# Patient Record
Sex: FEMALE | Race: WHITE | Employment: FULL TIME | URBAN - METROPOLITAN AREA
[De-identification: names, ages, dates, MRNs, and addresses within clinical notes are randomized per-mention and may not be internally consistent; named-entity substitution may affect disease eponyms.]

---

## 2017-07-23 PROBLEM — I10 ACCELERATED HYPERTENSION: Status: ACTIVE | Noted: 2017-07-23

## 2018-03-05 NOTE — PROGRESS NOTES
I have attempted without success to contact this patient by phone for Preadmission Testing phone assessment. Message left for pt to return call.

## 2018-03-06 RX ORDER — IBUPROFEN 800 MG/1
800 TABLET ORAL EVERY 6 HOURS PRN
COMMUNITY
End: 2019-06-26

## 2018-03-06 RX ORDER — AMLODIPINE BESYLATE 5 MG/1
5 TABLET ORAL DAILY
COMMUNITY
End: 2019-06-26

## 2018-03-17 ENCOUNTER — HOSPITAL ENCOUNTER (OUTPATIENT)
Age: 43
Discharge: HOME | End: 2018-03-17
Payer: COMMERCIAL

## 2018-03-17 LAB
ANION GAP SERPL CALCULATED.3IONS-SCNC: 11 MMOL/L (ref 7–16)
BUN BLDV-MCNC: 15 MG/DL (ref 6–20)
CALCIUM SERPL-MCNC: 9.2 MG/DL (ref 8.6–10.2)
CHLORIDE BLD-SCNC: 101 MMOL/L (ref 98–107)
CO2: 29 MMOL/L (ref 22–29)
CREAT SERPL-MCNC: 0.8 MG/DL (ref 0.5–1)
EKG ATRIAL RATE: 92 BPM
EKG P AXIS: 63 DEGREES
EKG P-R INTERVAL: 124 MS
EKG Q-T INTERVAL: 350 MS
EKG QRS DURATION: 86 MS
EKG QTC CALCULATION (BAZETT): 432 MS
EKG R AXIS: 86 DEGREES
EKG T AXIS: 7 DEGREES
EKG VENTRICULAR RATE: 92 BPM
GFR AFRICAN AMERICAN: >60
GFR NON-AFRICAN AMERICAN: >60 ML/MIN/1.73
GLUCOSE BLD-MCNC: 134 MG/DL (ref 74–109)
HCG(URINE) PREGNANCY TEST: NEGATIVE
POTASSIUM SERPL-SCNC: 3.5 MMOL/L (ref 3.5–5)
SODIUM BLD-SCNC: 141 MMOL/L (ref 132–146)

## 2018-03-17 PROCEDURE — 81025 URINE PREGNANCY TEST: CPT

## 2018-03-17 PROCEDURE — 80048 BASIC METABOLIC PNL TOTAL CA: CPT

## 2018-03-17 PROCEDURE — 36415 COLL VENOUS BLD VENIPUNCTURE: CPT

## 2018-03-23 ENCOUNTER — HOSPITAL ENCOUNTER (OUTPATIENT)
Age: 43
Setting detail: OUTPATIENT SURGERY
Discharge: HOME | End: 2018-03-23
Attending: ORTHOPAEDIC SURGERY | Admitting: ORTHOPAEDIC SURGERY
Payer: COMMERCIAL

## 2018-03-23 ENCOUNTER — ANESTHESIA (OUTPATIENT)
Dept: OPERATING ROOM | Age: 43
End: 2018-03-23
Payer: COMMERCIAL

## 2018-03-23 ENCOUNTER — ANESTHESIA EVENT (OUTPATIENT)
Dept: OPERATING ROOM | Age: 43
End: 2018-03-23
Payer: COMMERCIAL

## 2018-03-23 VITALS
HEIGHT: 60 IN | BODY MASS INDEX: 34.16 KG/M2 | DIASTOLIC BLOOD PRESSURE: 64 MMHG | OXYGEN SATURATION: 99 % | TEMPERATURE: 98.2 F | SYSTOLIC BLOOD PRESSURE: 137 MMHG | WEIGHT: 174 LBS | HEART RATE: 101 BPM | RESPIRATION RATE: 16 BRPM

## 2018-03-23 VITALS — OXYGEN SATURATION: 100 % | DIASTOLIC BLOOD PRESSURE: 73 MMHG | SYSTOLIC BLOOD PRESSURE: 167 MMHG | TEMPERATURE: 96.8 F

## 2018-03-23 DIAGNOSIS — Z01.818 PRE-OP TESTING: Primary | ICD-10-CM

## 2018-03-23 DIAGNOSIS — G89.18 POST-OP PAIN: ICD-10-CM

## 2018-03-23 LAB — HCG(URINE) PREGNANCY TEST: NEGATIVE

## 2018-03-23 PROCEDURE — 7100000000 HC PACU RECOVERY - FIRST 15 MIN: Performed by: ORTHOPAEDIC SURGERY

## 2018-03-23 PROCEDURE — 3700000000 HC ANESTHESIA ATTENDED CARE: Performed by: ORTHOPAEDIC SURGERY

## 2018-03-23 PROCEDURE — 2500000003 HC RX 250 WO HCPCS: Performed by: NURSE ANESTHETIST, CERTIFIED REGISTERED

## 2018-03-23 PROCEDURE — 29848 WRIST ENDOSCOPY/SURGERY: CPT | Performed by: ORTHOPAEDIC SURGERY

## 2018-03-23 PROCEDURE — A4565 SLINGS: HCPCS | Performed by: ORTHOPAEDIC SURGERY

## 2018-03-23 PROCEDURE — 7100000011 HC PHASE II RECOVERY - ADDTL 15 MIN: Performed by: ORTHOPAEDIC SURGERY

## 2018-03-23 PROCEDURE — 3600000012 HC SURGERY LEVEL 2 ADDTL 15MIN: Performed by: ORTHOPAEDIC SURGERY

## 2018-03-23 PROCEDURE — 7100000010 HC PHASE II RECOVERY - FIRST 15 MIN: Performed by: ORTHOPAEDIC SURGERY

## 2018-03-23 PROCEDURE — 81025 URINE PREGNANCY TEST: CPT

## 2018-03-23 PROCEDURE — 3600000002 HC SURGERY LEVEL 2 BASE: Performed by: ORTHOPAEDIC SURGERY

## 2018-03-23 PROCEDURE — 6360000002 HC RX W HCPCS: Performed by: NURSE ANESTHETIST, CERTIFIED REGISTERED

## 2018-03-23 PROCEDURE — 2720000010 HC SURG SUPPLY STERILE: Performed by: ORTHOPAEDIC SURGERY

## 2018-03-23 PROCEDURE — 64718 REVISE ULNAR NERVE AT ELBOW: CPT | Performed by: ORTHOPAEDIC SURGERY

## 2018-03-23 PROCEDURE — 26055 INCISE FINGER TENDON SHEATH: CPT | Performed by: ORTHOPAEDIC SURGERY

## 2018-03-23 PROCEDURE — 3700000001 HC ADD 15 MINUTES (ANESTHESIA): Performed by: ORTHOPAEDIC SURGERY

## 2018-03-23 PROCEDURE — 6360000002 HC RX W HCPCS: Performed by: ANESTHESIOLOGY

## 2018-03-23 PROCEDURE — 7100000001 HC PACU RECOVERY - ADDTL 15 MIN: Performed by: ORTHOPAEDIC SURGERY

## 2018-03-23 PROCEDURE — 2500000003 HC RX 250 WO HCPCS: Performed by: ORTHOPAEDIC SURGERY

## 2018-03-23 PROCEDURE — 6360000002 HC RX W HCPCS: Performed by: PHYSICIAN ASSISTANT

## 2018-03-23 PROCEDURE — 2580000003 HC RX 258: Performed by: PHYSICIAN ASSISTANT

## 2018-03-23 RX ORDER — DIPHENHYDRAMINE HYDROCHLORIDE 50 MG/ML
12.5 INJECTION INTRAMUSCULAR; INTRAVENOUS
Status: DISCONTINUED | OUTPATIENT
Start: 2018-03-23 | End: 2018-03-23 | Stop reason: HOSPADM

## 2018-03-23 RX ORDER — SODIUM CHLORIDE 0.9 % (FLUSH) 0.9 %
10 SYRINGE (ML) INJECTION EVERY 12 HOURS SCHEDULED
Status: DISCONTINUED | OUTPATIENT
Start: 2018-03-23 | End: 2018-03-23 | Stop reason: HOSPADM

## 2018-03-23 RX ORDER — SODIUM CHLORIDE 9 MG/ML
INJECTION, SOLUTION INTRAVENOUS CONTINUOUS
Status: DISCONTINUED | OUTPATIENT
Start: 2018-03-23 | End: 2018-03-23 | Stop reason: HOSPADM

## 2018-03-23 RX ORDER — OXYCODONE HYDROCHLORIDE AND ACETAMINOPHEN 5; 325 MG/1; MG/1
2 TABLET ORAL PRN
Status: DISCONTINUED | OUTPATIENT
Start: 2018-03-23 | End: 2018-03-23 | Stop reason: HOSPADM

## 2018-03-23 RX ORDER — LIDOCAINE HYDROCHLORIDE 20 MG/ML
INJECTION, SOLUTION EPIDURAL; INFILTRATION; INTRACAUDAL; PERINEURAL PRN
Status: DISCONTINUED | OUTPATIENT
Start: 2018-03-23 | End: 2018-03-23 | Stop reason: SDUPTHER

## 2018-03-23 RX ORDER — MIDAZOLAM HYDROCHLORIDE 1 MG/ML
INJECTION INTRAMUSCULAR; INTRAVENOUS PRN
Status: DISCONTINUED | OUTPATIENT
Start: 2018-03-23 | End: 2018-03-23 | Stop reason: SDUPTHER

## 2018-03-23 RX ORDER — MORPHINE SULFATE 2 MG/ML
2 INJECTION, SOLUTION INTRAMUSCULAR; INTRAVENOUS EVERY 5 MIN PRN
Status: DISCONTINUED | OUTPATIENT
Start: 2018-03-23 | End: 2018-03-23 | Stop reason: HOSPADM

## 2018-03-23 RX ORDER — OXYCODONE HYDROCHLORIDE AND ACETAMINOPHEN 5; 325 MG/1; MG/1
1 TABLET ORAL PRN
Status: DISCONTINUED | OUTPATIENT
Start: 2018-03-23 | End: 2018-03-23 | Stop reason: HOSPADM

## 2018-03-23 RX ORDER — MEPERIDINE HYDROCHLORIDE 25 MG/ML
12.5 INJECTION INTRAMUSCULAR; INTRAVENOUS; SUBCUTANEOUS EVERY 5 MIN PRN
Status: DISCONTINUED | OUTPATIENT
Start: 2018-03-23 | End: 2018-03-23 | Stop reason: HOSPADM

## 2018-03-23 RX ORDER — BUPIVACAINE HYDROCHLORIDE AND EPINEPHRINE 5; 5 MG/ML; UG/ML
INJECTION, SOLUTION EPIDURAL; INTRACAUDAL; PERINEURAL PRN
Status: DISCONTINUED | OUTPATIENT
Start: 2018-03-23 | End: 2018-03-23 | Stop reason: HOSPADM

## 2018-03-23 RX ORDER — MORPHINE SULFATE 2 MG/ML
1 INJECTION, SOLUTION INTRAMUSCULAR; INTRAVENOUS EVERY 5 MIN PRN
Status: COMPLETED | OUTPATIENT
Start: 2018-03-23 | End: 2018-03-23

## 2018-03-23 RX ORDER — OXYCODONE HYDROCHLORIDE AND ACETAMINOPHEN 5; 325 MG/1; MG/1
1 TABLET ORAL EVERY 6 HOURS PRN
Qty: 28 TABLET | Refills: 0 | Status: SHIPPED | OUTPATIENT
Start: 2018-03-23 | End: 2018-03-30

## 2018-03-23 RX ORDER — PROPOFOL 10 MG/ML
INJECTION, EMULSION INTRAVENOUS PRN
Status: DISCONTINUED | OUTPATIENT
Start: 2018-03-23 | End: 2018-03-23 | Stop reason: SDUPTHER

## 2018-03-23 RX ORDER — FENTANYL CITRATE 50 UG/ML
INJECTION, SOLUTION INTRAMUSCULAR; INTRAVENOUS PRN
Status: DISCONTINUED | OUTPATIENT
Start: 2018-03-23 | End: 2018-03-23 | Stop reason: SDUPTHER

## 2018-03-23 RX ORDER — SODIUM CHLORIDE 0.9 % (FLUSH) 0.9 %
10 SYRINGE (ML) INJECTION PRN
Status: DISCONTINUED | OUTPATIENT
Start: 2018-03-23 | End: 2018-03-23 | Stop reason: HOSPADM

## 2018-03-23 RX ADMIN — FENTANYL CITRATE 100 MCG: 50 INJECTION, SOLUTION INTRAMUSCULAR; INTRAVENOUS at 09:11

## 2018-03-23 RX ADMIN — MORPHINE SULFATE 1 MG: 2 INJECTION, SOLUTION INTRAMUSCULAR; INTRAVENOUS at 10:05

## 2018-03-23 RX ADMIN — MORPHINE SULFATE 1 MG: 2 INJECTION, SOLUTION INTRAMUSCULAR; INTRAVENOUS at 10:12

## 2018-03-23 RX ADMIN — MORPHINE SULFATE 1 MG: 2 INJECTION, SOLUTION INTRAMUSCULAR; INTRAVENOUS at 10:17

## 2018-03-23 RX ADMIN — LIDOCAINE HYDROCHLORIDE 100 MG: 20 INJECTION, SOLUTION EPIDURAL; INFILTRATION; INTRACAUDAL; PERINEURAL at 09:11

## 2018-03-23 RX ADMIN — MORPHINE SULFATE 1 MG: 2 INJECTION, SOLUTION INTRAMUSCULAR; INTRAVENOUS at 10:22

## 2018-03-23 RX ADMIN — MIDAZOLAM HYDROCHLORIDE 2 MG: 1 INJECTION, SOLUTION INTRAMUSCULAR; INTRAVENOUS at 09:11

## 2018-03-23 RX ADMIN — SODIUM CHLORIDE: 9 INJECTION, SOLUTION INTRAVENOUS at 09:09

## 2018-03-23 RX ADMIN — CEFAZOLIN SODIUM 2 G: 2 SOLUTION INTRAVENOUS at 09:09

## 2018-03-23 RX ADMIN — PROPOFOL 200 MG: 10 INJECTION, EMULSION INTRAVENOUS at 09:11

## 2018-03-23 ASSESSMENT — PULMONARY FUNCTION TESTS
PIF_VALUE: 4
PIF_VALUE: 20
PIF_VALUE: 2
PIF_VALUE: 5
PIF_VALUE: 10
PIF_VALUE: 3
PIF_VALUE: 18
PIF_VALUE: 5
PIF_VALUE: 2
PIF_VALUE: 38
PIF_VALUE: 2
PIF_VALUE: 10
PIF_VALUE: 1
PIF_VALUE: 5
PIF_VALUE: 2
PIF_VALUE: 12
PIF_VALUE: 2
PIF_VALUE: 24
PIF_VALUE: 12
PIF_VALUE: 4
PIF_VALUE: 1
PIF_VALUE: 2
PIF_VALUE: 2
PIF_VALUE: 10
PIF_VALUE: 19
PIF_VALUE: 19
PIF_VALUE: 2
PIF_VALUE: 10
PIF_VALUE: 1
PIF_VALUE: 2
PIF_VALUE: 19
PIF_VALUE: 1
PIF_VALUE: 10
PIF_VALUE: 4
PIF_VALUE: 2
PIF_VALUE: 2
PIF_VALUE: 10
PIF_VALUE: 1
PIF_VALUE: 1
PIF_VALUE: 2

## 2018-03-23 ASSESSMENT — PAIN DESCRIPTION - DESCRIPTORS
DESCRIPTORS: DISCOMFORT;ACHING
DESCRIPTORS: DISCOMFORT
DESCRIPTORS: DULL
DESCRIPTORS: CONSTANT;DISCOMFORT
DESCRIPTORS: DULL

## 2018-03-23 ASSESSMENT — PAIN SCALES - GENERAL
PAINLEVEL_OUTOF10: 6
PAINLEVEL_OUTOF10: 5
PAINLEVEL_OUTOF10: 6
PAINLEVEL_OUTOF10: 2
PAINLEVEL_OUTOF10: 2
PAINLEVEL_OUTOF10: 4
PAINLEVEL_OUTOF10: 2
PAINLEVEL_OUTOF10: 3
PAINLEVEL_OUTOF10: 4

## 2018-03-23 ASSESSMENT — PAIN DESCRIPTION - PAIN TYPE
TYPE: SURGICAL PAIN

## 2018-03-23 ASSESSMENT — PAIN DESCRIPTION - FREQUENCY
FREQUENCY: CONTINUOUS

## 2018-03-23 ASSESSMENT — PAIN DESCRIPTION - ONSET
ONSET: ON-GOING

## 2018-03-23 ASSESSMENT — PAIN DESCRIPTION - PROGRESSION
CLINICAL_PROGRESSION: NOT CHANGED
CLINICAL_PROGRESSION: GRADUALLY WORSENING
CLINICAL_PROGRESSION: GRADUALLY IMPROVING

## 2018-03-23 ASSESSMENT — PAIN DESCRIPTION - ORIENTATION
ORIENTATION: LEFT

## 2018-03-23 ASSESSMENT — PAIN DESCRIPTION - LOCATION
LOCATION: ELBOW

## 2018-03-23 ASSESSMENT — PAIN - FUNCTIONAL ASSESSMENT: PAIN_FUNCTIONAL_ASSESSMENT: 0-10

## 2018-03-23 NOTE — BRIEF OP NOTE
Brief Postoperative Note    Juana Rodriguez  YOB: 1975  [id number]    Pre-operative Diagnosis: left cubital tunnel, left carpal tunnel, left middle finger trigger    Post-operative Diagnosis: Same    Procedure: left endoscopic CTR, left middle finger trigger release, left in situ ulnar n decompression at the elbow    Anesthesia: General and Local    Surgeons/Assistants: Dr. Jorge Luis Crockett, Mary Wagner PA-C    Estimated Blood Loss: less than 50     Complications: None    Specimens: Was Not Obtained    Findings: see op note    Electronically signed by SUMMER Mondragon on 3/23/2018 at 9:56 AM

## 2018-03-23 NOTE — ANESTHESIA PRE PROCEDURE
Department of Anesthesiology  Preprocedure Note       Name:  Katrina Rosario   Age:  43 y.o.  :  1975                                          MRN:  [id number]         Date:  3/23/2018      Surgeon: Nguyễn Godinez):  Remy Bright MD    Procedure: Procedure(s):  LEFT ULNAR NERVER IN SITES DECOMPRESSION WITH POSSIBLE TRANSPOSITION     LEFT ENDOSCOPIC CARPAL TUNNEL RELEASE    LEFT MIDDLE TRIGGER FINGER RELEASE    Medications prior to admission:   Prior to Admission medications    Medication Sig Start Date End Date Taking? Authorizing Provider   oxyCODONE-acetaminophen (PERCOCET) 5-325 MG per tablet Take 1 tablet by mouth every 6 hours as needed for Pain for up to 7 days . Take lowest dose possible to manage pain. 3/23/18 3/30/18 Yes Javier Sevilla DO   amLODIPine (NORVASC) 5 MG tablet Take 5 mg by mouth daily   Yes Historical Provider, MD   ibuprofen (ADVIL;MOTRIN) 800 MG tablet Take 800 mg by mouth every 6 hours as needed for Pain   Yes Historical Provider, MD   valsartan (DIOVAN) 160 MG tablet Take 1 tablet by mouth daily 17  Yes Veronique Garcia MD   hydrochlorothiazide (HYDRODIURIL) 25 MG tablet Take 1 tablet by mouth daily 17  Yes Veronique Garcia MD   venlafaxine (EFFEXOR XR) 75 MG extended release capsule Take 75 mg by mouth every evening  17  Yes Historical Provider, MD   phentermine (ADIPEX-P) 37.5 MG capsule Take 37.5 mg by mouth every morning .     Historical Provider, MD       Current medications:    Current Facility-Administered Medications   Medication Dose Route Frequency Provider Last Rate Last Dose    0.9 % sodium chloride infusion   Intravenous Continuous SUMMER Gorman        ceFAZolin (ANCEF) 2 g in sterile water 20 mL IV syringe  2 g Intravenous On Call to [address] [place] [address]        sodium chloride flush 0.9 % injection 10 mL  10 mL Intravenous 2 times per day SUMMER Gorman        sodium chloride flush 0.9 % injection 10 mL  10 mL Intravenous PRN SUMMER Gorman       [facility] FEBRUARY 2012    DIAGNOSTIC LAPAROSCOPY       Social History:    Social History   Substance Use Topics    Smoking status: Never Smoker    Smokeless tobacco: Never Used    Alcohol use Yes      Comment: occassional                                Counseling given: Not Answered      Vital Signs (Current):   Vitals:    03/23/18 0813 03/23/18 0815   BP: 124/69    Pulse: 92    Resp: 20    Temp: 97.9 °F (36.6 °C)    TempSrc: Temporal    SpO2: 99%    Weight: 171 lb (77.6 kg) 174 lb (78.9 kg)   Height: 5' (1.524 m)                                               BP Readings from Last 3 Encounters:   03/23/18 124/69   02/08/18 (!) 138/94   12/17/17 133/82       NPO Status: Time of last liquid consumption: 2300                        Time of last solid consumption: 2300                        Date of last liquid consumption: 03/22/18                        Date of last solid food consumption: 03/22/18    BMI:   Wt Readings from Last 3 Encounters:   03/23/18 174 lb (78.9 kg)   02/08/18 174 lb (78.9 kg)   12/17/17 176 lb (79.8 kg)     Body mass index is 33.98 kg/m². CBC:   Lab Results   Component Value Date    WBC 15.7 07/20/2017    RBC 4.81 07/20/2017    HGB 12.6 07/20/2017    HCT 39.9 07/20/2017    MCV 83.0 07/20/2017    RDW 13.9 07/20/2017     07/20/2017       CMP:   Lab Results   Component Value Date     03/17/2018    K 3.5 03/17/2018     03/17/2018    CO2 29 03/17/2018    BUN 15 03/17/2018    CREATININE 0.8 03/17/2018    GFRAA >60 03/17/2018    LABGLOM >60 03/17/2018    GLUCOSE 134 03/17/2018    GLUCOSE 84 12/27/2011    PROT 7.1 11/21/2014    CALCIUM 9.2 03/17/2018    BILITOT 0.2 11/21/2014    ALKPHOS 49 11/21/2014    AST 22 11/21/2014    ALT 34 11/21/2014       POC Tests: No results for input(s): POCGLU, POCNA, POCK, POCCL, POCBUN, POCHEMO, POCHCT in the last 72 hours.     Coags:   Lab Results   Component Value Date    PROTIME 13.7 10/02/2012    INR 1.3 10/02/2012    APTT 30.2 10/02/2012       HCG (If

## 2018-04-02 ENCOUNTER — OFFICE VISIT (OUTPATIENT)
Dept: ORTHOPEDIC SURGERY | Age: 43
End: 2018-04-02

## 2018-04-02 VITALS
BODY MASS INDEX: 32.85 KG/M2 | SYSTOLIC BLOOD PRESSURE: 122 MMHG | TEMPERATURE: 98.2 F | HEIGHT: 61 IN | HEART RATE: 95 BPM | WEIGHT: 174 LBS | DIASTOLIC BLOOD PRESSURE: 68 MMHG

## 2018-04-02 DIAGNOSIS — G56.03 BILATERAL CARPAL TUNNEL SYNDROME: Primary | ICD-10-CM

## 2018-04-02 PROCEDURE — 99024 POSTOP FOLLOW-UP VISIT: CPT | Performed by: ORTHOPAEDIC SURGERY

## 2018-12-24 ENCOUNTER — HOSPITAL ENCOUNTER (OUTPATIENT)
Dept: ULTRASOUND IMAGING | Age: 43
Discharge: HOME | End: 2018-12-26
Payer: MEDICAID

## 2018-12-24 DIAGNOSIS — E01.0 BIG THYROID: ICD-10-CM

## 2018-12-24 PROCEDURE — 76536 US EXAM OF HEAD AND NECK: CPT

## 2019-06-26 ENCOUNTER — HOSPITAL ENCOUNTER (EMERGENCY)
Age: 44
Discharge: HOME | End: 2019-06-26
Attending: EMERGENCY MEDICINE
Payer: COMMERCIAL

## 2019-06-26 ENCOUNTER — APPOINTMENT (OUTPATIENT)
Dept: CT IMAGING | Age: 44
End: 2019-06-26
Payer: COMMERCIAL

## 2019-06-26 VITALS
HEART RATE: 92 BPM | SYSTOLIC BLOOD PRESSURE: 132 MMHG | HEIGHT: 61 IN | RESPIRATION RATE: 16 BRPM | TEMPERATURE: 98.6 F | BODY MASS INDEX: 31.15 KG/M2 | DIASTOLIC BLOOD PRESSURE: 86 MMHG | WEIGHT: 165 LBS | OXYGEN SATURATION: 98 %

## 2019-06-26 DIAGNOSIS — R10.9 FLANK PAIN: Primary | ICD-10-CM

## 2019-06-26 DIAGNOSIS — M54.50 LOW BACK PAIN WITHOUT SCIATICA, UNSPECIFIED BACK PAIN LATERALITY, UNSPECIFIED CHRONICITY: ICD-10-CM

## 2019-06-26 LAB
BACTERIA: ABNORMAL /HPF
BILIRUBIN URINE: NEGATIVE
BLOOD, URINE: ABNORMAL
CLARITY: CLEAR
COLOR: YELLOW
GLUCOSE URINE: NEGATIVE MG/DL
HCG(URINE) PREGNANCY TEST: NEGATIVE
KETONES, URINE: NEGATIVE MG/DL
LEUKOCYTE ESTERASE, URINE: NEGATIVE
NITRITE, URINE: NEGATIVE
PH UA: 5.5 (ref 5–9)
PROTEIN UA: NEGATIVE MG/DL
RBC UA: ABNORMAL /HPF (ref 0–2)
SPECIFIC GRAVITY UA: 1.02 (ref 1–1.03)
UROBILINOGEN, URINE: 0.2 E.U./DL
WBC UA: ABNORMAL /HPF (ref 0–5)

## 2019-06-26 PROCEDURE — 74176 CT ABD & PELVIS W/O CONTRAST: CPT

## 2019-06-26 PROCEDURE — 6360000002 HC RX W HCPCS: Performed by: EMERGENCY MEDICINE

## 2019-06-26 PROCEDURE — 81025 URINE PREGNANCY TEST: CPT

## 2019-06-26 PROCEDURE — 99284 EMERGENCY DEPT VISIT MOD MDM: CPT

## 2019-06-26 PROCEDURE — 81001 URINALYSIS AUTO W/SCOPE: CPT

## 2019-06-26 RX ORDER — ONDANSETRON 2 MG/ML
4 INJECTION INTRAMUSCULAR; INTRAVENOUS EVERY 6 HOURS PRN
Status: DISCONTINUED | OUTPATIENT
Start: 2019-06-26 | End: 2019-06-26 | Stop reason: HOSPADM

## 2019-06-26 RX ORDER — KETOROLAC TROMETHAMINE 30 MG/ML
30 INJECTION, SOLUTION INTRAMUSCULAR; INTRAVENOUS EVERY 6 HOURS PRN
Status: DISCONTINUED | OUTPATIENT
Start: 2019-06-26 | End: 2019-06-26 | Stop reason: HOSPADM

## 2019-06-26 RX ADMIN — ONDANSETRON HYDROCHLORIDE 4 MG: 2 SOLUTION INTRAMUSCULAR; INTRAVENOUS at 15:43

## 2019-06-26 RX ADMIN — KETOROLAC TROMETHAMINE 30 MG: 30 INJECTION, SOLUTION INTRAMUSCULAR at 15:45

## 2019-06-26 ASSESSMENT — PAIN DESCRIPTION - DESCRIPTORS: DESCRIPTORS: ACHING

## 2019-06-26 ASSESSMENT — PAIN SCALES - GENERAL
PAINLEVEL_OUTOF10: 7
PAINLEVEL_OUTOF10: 7

## 2019-06-26 ASSESSMENT — PAIN DESCRIPTION - FREQUENCY: FREQUENCY: INTERMITTENT

## 2019-06-26 ASSESSMENT — PAIN DESCRIPTION - ORIENTATION: ORIENTATION: RIGHT

## 2019-06-26 ASSESSMENT — PAIN DESCRIPTION - LOCATION: LOCATION: FLANK

## 2019-08-18 ENCOUNTER — HOSPITAL ENCOUNTER (EMERGENCY)
Age: 44
Discharge: HOME | End: 2019-08-18
Attending: EMERGENCY MEDICINE
Payer: COMMERCIAL

## 2019-08-18 VITALS
SYSTOLIC BLOOD PRESSURE: 132 MMHG | HEART RATE: 92 BPM | WEIGHT: 168 LBS | HEIGHT: 61 IN | TEMPERATURE: 98.5 F | OXYGEN SATURATION: 98 % | RESPIRATION RATE: 16 BRPM | BODY MASS INDEX: 31.72 KG/M2 | DIASTOLIC BLOOD PRESSURE: 78 MMHG

## 2019-08-18 DIAGNOSIS — T14.8XXA BRUISE: Primary | ICD-10-CM

## 2019-08-18 DIAGNOSIS — T14.8XXA HEMATOMA: ICD-10-CM

## 2019-08-18 PROCEDURE — 99283 EMERGENCY DEPT VISIT LOW MDM: CPT

## 2019-08-18 ASSESSMENT — PAIN DESCRIPTION - PROGRESSION: CLINICAL_PROGRESSION: GRADUALLY WORSENING

## 2019-08-18 ASSESSMENT — PAIN DESCRIPTION - ONSET: ONSET: GRADUAL

## 2019-08-18 ASSESSMENT — PAIN DESCRIPTION - LOCATION: LOCATION: ARM

## 2019-08-18 ASSESSMENT — PAIN DESCRIPTION - PAIN TYPE: TYPE: ACUTE PAIN

## 2019-08-18 ASSESSMENT — PAIN DESCRIPTION - DESCRIPTORS: DESCRIPTORS: ACHING;ITCHING

## 2019-08-18 ASSESSMENT — PAIN DESCRIPTION - ORIENTATION: ORIENTATION: RIGHT

## 2019-08-18 ASSESSMENT — PAIN DESCRIPTION - FREQUENCY: FREQUENCY: CONTINUOUS

## 2019-08-18 ASSESSMENT — PAIN SCALES - GENERAL: PAINLEVEL_OUTOF10: 4

## 2019-08-19 ASSESSMENT — ENCOUNTER SYMPTOMS
BLOOD IN STOOL: 0
ABDOMINAL PAIN: 0
COUGH: 0
NAUSEA: 0
BACK PAIN: 0
VOMITING: 0
SHORTNESS OF BREATH: 0

## 2019-08-19 NOTE — ED PROVIDER NOTES
detail and they are aware of the specific conditions for emergent return, as well as the importance of follow-up. There are no discharge medications for this patient. Diagnosis:  1. Bruise    2. Hematoma        Disposition:  Patient's disposition: Discharge to home  Patient's condition is stable. [facility]    ED Course as of Aug 18 2159   Brennne Light Aug 18, 2019   2157 Labs drawn on Thursday and has been having some dependent edema and hematoma spreading distally but not proximally. Pulses are 2+ in the antecubital fossa as well as radial and ulnar. Distal cap refill less than 2 seconds. Hands warm and well-perfused. Sensation intact. Strength equal and within normal limits. See her posted lab work done a couple of days ago. Deferred lab work today. The patient is nontoxic appearing and stable for outpatient treatment and management. They were instructed to follow-up with their primary care physician and were given warning signs to return to the ED. All of their questions were answered at this time and are agreeable with discharge and early follow up.     [BM]      ED Course User Index  [BM] DO Leo Conroy DO  Resident  08/19/19 [id number]

## 2020-02-14 ENCOUNTER — TELEPHONE (OUTPATIENT)
Dept: ORTHOPEDIC SURGERY | Age: 45
End: 2020-02-14

## 2020-02-18 ENCOUNTER — OFFICE VISIT (OUTPATIENT)
Dept: ORTHOPEDIC SURGERY | Age: 45
End: 2020-02-18
Payer: COMMERCIAL

## 2020-02-18 VITALS — HEART RATE: 80 BPM | SYSTOLIC BLOOD PRESSURE: 130 MMHG | DIASTOLIC BLOOD PRESSURE: 80 MMHG | RESPIRATION RATE: 18 BRPM

## 2020-02-18 PROCEDURE — 20551 NJX 1 TENDON ORIGIN/INSJ: CPT | Performed by: ORTHOPAEDIC SURGERY

## 2020-02-18 PROCEDURE — 1036F TOBACCO NON-USER: CPT | Performed by: ORTHOPAEDIC SURGERY

## 2020-02-18 PROCEDURE — G8427 DOCREV CUR MEDS BY ELIG CLIN: HCPCS | Performed by: ORTHOPAEDIC SURGERY

## 2020-02-18 PROCEDURE — G8417 CALC BMI ABV UP PARAM F/U: HCPCS | Performed by: ORTHOPAEDIC SURGERY

## 2020-02-18 PROCEDURE — G8484 FLU IMMUNIZE NO ADMIN: HCPCS | Performed by: ORTHOPAEDIC SURGERY

## 2020-02-18 PROCEDURE — 99213 OFFICE O/P EST LOW 20 MIN: CPT | Performed by: ORTHOPAEDIC SURGERY

## 2020-02-18 RX ORDER — BETAMETHASONE SODIUM PHOSPHATE AND BETAMETHASONE ACETATE 3; 3 MG/ML; MG/ML
6 INJECTION, SUSPENSION INTRA-ARTICULAR; INTRALESIONAL; INTRAMUSCULAR; SOFT TISSUE ONCE
Status: COMPLETED | OUTPATIENT
Start: 2020-02-18 | End: 2020-02-18

## 2020-02-18 RX ORDER — AMLODIPINE BESYLATE 5 MG/1
5 TABLET ORAL DAILY
COMMUNITY
End: 2021-10-12

## 2020-02-18 RX ADMIN — BETAMETHASONE SODIUM PHOSPHATE AND BETAMETHASONE ACETATE 6 MG: 3; 3 INJECTION, SUSPENSION INTRA-ARTICULAR; INTRALESIONAL; INTRAMUSCULAR; SOFT TISSUE at 15:44

## 2020-02-18 NOTE — PATIENT INSTRUCTIONS
Patient Education        Tennis Elbow: Exercises  Introduction  Here are some examples of exercises for you to try. The exercises may be suggested for a condition or for rehabilitation. Start each exercise slowly. Ease off the exercises if you start to have pain. You will be told when to start these exercises and which ones will work best for you. How to do the exercises  Wrist flexor stretch   1. Extend your arm in front of you with your palm up. 2. Bend your wrist, pointing your hand toward the floor. 3. With your other hand, gently bend your wrist farther until you feel a mild to moderate stretch in your forearm. 4. Hold for at least 15 to 30 seconds. Repeat 2 to 4 times. Wrist extensor stretch   1. Repeat steps 1 to 4 of the stretch above but begin with your extended hand palm down. Ball or sock squeeze   1. Hold a tennis ball (or a rolled-up sock) in your hand. 2. Make a fist around the ball (or sock) and squeeze. 3. Hold for about 6 seconds, and then relax for up to 10 seconds. 4. Repeat 8 to 12 times. 5. Switch the ball (or sock) to your other hand and do 8 to 12 times. Wrist deviation   1. Sit so that your arm is supported but your hand hangs off the edge of a flat surface, such as a table. 2. Hold your hand out like you are shaking hands with someone. 3. Move your hand up and down. 4. Repeat this motion 8 to 12 times. 5. Switch arms. 6. Try to do this exercise twice with each hand. Wrist curls   1. Place your forearm on a table with your hand hanging over the edge of the table, palm up. 2. Place a 1- to 2-pound weight in your hand. This may be a dumbbell, a can of food, or a filled water bottle. 3. Slowly raise and lower the weight while keeping your forearm on the table and your palm facing up. 4. Repeat this motion 8 to 12 times. 5. Switch arms, and do steps 1 through 4.  6. Repeat with your hand facing down toward the floor. Switch arms. Biceps curls   1.  Sit leaning forward with your legs slightly spread and your left hand on your left thigh. 2. Place your right elbow on your right thigh, and hold the weight with your forearm horizontal.  3. Slowly curl the weight up and toward your chest.  4. Repeat this motion 8 to 12 times. 5. Switch arms, and do steps 1 through 4. Follow-up care is a key part of your treatment and safety. Be sure to make and go to all appointments, and call your doctor if you are having problems. It's also a good idea to know your test results and keep a list of the medicines you take. Where can you learn more? Go to https://[employer] RochesterpeCARD.comeweb.Geekangels. org and sign in to your Infinite Z account. Enter I928 in the Product Hunt box to learn more about \"Tennis Elbow: Exercises. \"     If you do not have an account, please click on the \"Sign Up Now\" link. Current as of: June 26, 2019  Content Version: 12.3  © 5397-6744 Healthwise, Incorporated. Care instructions adapted under license by [facility] ([facility]). If you have questions about a medical condition or this instruction, always ask your healthcare professional. [place] [address] any warranty or liability for your use of this information.

## 2020-02-18 NOTE — PROGRESS NOTES
Department of Orthopedic Surgery  [place] Andrez Vega MD  History and Physical      CHIEF COMPLAINT:  Right elbow pain     HISTORY OF PRESENT ILLNESS:                The patient is a 40 y.o. female who presents with right elbow pain that has been going on since 2019 and is getting worse. 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 She states she had similar pain on the other elbow that has resolved as well as cubital tunnel and carpal tunnel releases in 2018. She says that she does have some numbness into her right small finger that feels similar to her symptoms on the other elbow. She has tried a counter force elbow brace without any relief in her symptoms.      Past Medical History:        Diagnosis Date    Accelerated hypertension 2017    Asthma     Carpal tunnel syndrome of left wrist     Fibromuscular dysplasia (HCC)     affects arteries, blockages occur    History of seasonal allergies     HX OTHER MEDICAL 2017    renal arteries bilaterally blocked; awaiting [facility] visit for angioplasties    Migraines     Pulmonary emboli ([facility] [address]      Past Surgical History:        Procedure Laterality Date    APPENDECTOMY      CARPAL TUNNEL RELEASE Right 2017     SECTION      x 3    DILATION AND CURETTAGE OF UTERUS  2014    Laparoscopy    DILATION AND CURETTAGE OF UTERUS  2015    KNEE SURGERY Left     reconstruction    KNEE SURGERY Left     acl    LAPAROSCOPY  2012    DIAGNOSTIC LAPAROSCOPY    OTHER SURGICAL HISTORY Left 2018    carpal tunnel release, middle finger release, ulnar nerve decompression    [place] INCISE FINGER TENDON SHEATH Left 3/23/2018    LEFT MIDDLE TRIGGER FINGER RELEASE performed by [place] Lowe MD at [place] [address] Left 3/23/2018    LEFT ULNAR NERVER IN SITES DECOMPRESSION   performed by [facility] Lowe MD at [facility] GONZAL[facility] Left 3/23/2018    LEFT ENDOSCOPIC CARPAL TUNNEL RELEASE   performed by Ansley Figueroa MD at [facility] OR     Current Medications:   No current facility-administered medications for this visit. Allergies:  Dye [iodides] and Vicodin [hydrocodone-acetaminophen]    Social History:   TOBACCO:   reports that she has never smoked. She has never used smokeless tobacco.  ETOH:   reports current alcohol use. DRUGS:   reports no history of drug use. ACTIVITIES OF DAILY LIVING:    OCCUPATION:    Family History:   History reviewed. No pertinent family history. REVIEW OF SYSTEMS:  CONSTITUTIONAL:  negative  EYES:  negative  HEENT:  negative  RESPIRATORY:  negative  CARDIOVASCULAR:  negative  MUSCULOSKELETAL:  positive for  Pain right elbow  NEUROLOGICAL:  positive for numbness and tingling right hand    PHYSICAL EXAM:    VITALS:  /80 (Site: Right Upper Arm, Position: Sitting, Cuff Size: Medium Adult)   Pulse 80   Resp 18   CONSTITUTIONAL:  awake, alert, cooperative, no apparent distress, and appears stated age  EYES:  Lids and lashes normal, pupils equal, round and reactive to light, extra ocular muscles intact, sclera clear, conjunctiva normal  ENT:  normocepalic, without obvious abnormality  NECK:  supple, symmetrical, trachea midline  LUNGS:  no increased work of breathing  CARDIOVASCULAR:  no edema  NEUROLOGIC:  Awake, alert, oriented to name, place and time. Cranial nerves II-XII are grossly intact. Motor is 5 out of 5 bilaterally. MUSCULOSKELETAL:  Right upper extremity: Full ROM to the shoulder and elbow. + TTP over the lateral epicondyle, no pain over the medial epicondyle. + tinel over the cubital tunnel. + Pain with resisted wrist extension. - tinel at the wrist, - conchita. SILT median, ulnar, radial nerve distributions. +2/4 radial pulse.      DATA:    CBC:   Lab Results   Component Value Date    WBC 15.7 07/20/2017    RBC 4.81 07/20/2017    HGB 12.6 07/20/2017    HCT 39.9 07/20/2017    MCV 83.0 07/20/2017    MCH 26.2 07/20/2017    MCHC 31.6 07/20/2017    RDW 13.9 07/20/2017     07/20/2017    MPV 8.5 07/20/2017     PT/INR:    Lab Results   Component Value Date    PROTIME 13.7 10/02/2012    INR 1.3 10/02/2012       Radiology Review:  Xray right elbow obtained and reviewed in office today. AP and lateral demonstrating no fracture or dislocation. Impression no fracture or dislocation. IMPRESSION:  · Right elbow lateral epicondylitis   · Right ulnar neuritis     PLAN:  Patient wanting and injection today for her elbow. Given wrist splint for when it flares up. Will start patient in a physical therapy program as well. Follow up in 3 months. Procedure Note Cortisone Injection for Elbow Epicondylitis    The right lateral epicondyle was identified as the injection site. The risk and benefits of a cortisone injection were explained and the patient consented to the injection. Under sterile conditions, the epicondyle was injected with a mixture of 1 mL of 1% Lidocaine and 1 mL of 6 mg/mL Betamethasone without complication. A sterile bandage was applied. I have seen and evaluated the patient and agree with the above assessment and plan on today's visit. I have performed the key components of the history and physical examination with significant findings of persistent right lateral epicondylitis refractory to home exercise program, oral anti-inflammatories, and counterforce bracing. Patient was provided a cortisone injection over the lateral condyle. Cock-up wrist brace was provided. Patient is referred to formal therapy. May follow-up every 3 months as needed. I concur with the findings and plan as documented.     Nicole Green MD  2/18/2020

## 2020-08-06 ENCOUNTER — OFFICE VISIT (OUTPATIENT)
Dept: ORTHOPEDIC SURGERY | Age: 45
End: 2020-08-06
Payer: COMMERCIAL

## 2020-08-06 VITALS — HEIGHT: 61 IN | BODY MASS INDEX: 31.72 KG/M2 | TEMPERATURE: 98.8 F | RESPIRATION RATE: 14 BRPM | WEIGHT: 168 LBS

## 2020-08-06 PROCEDURE — G8427 DOCREV CUR MEDS BY ELIG CLIN: HCPCS | Performed by: ORTHOPAEDIC SURGERY

## 2020-08-06 PROCEDURE — 1036F TOBACCO NON-USER: CPT | Performed by: ORTHOPAEDIC SURGERY

## 2020-08-06 PROCEDURE — 20551 NJX 1 TENDON ORIGIN/INSJ: CPT | Performed by: ORTHOPAEDIC SURGERY

## 2020-08-06 PROCEDURE — G8417 CALC BMI ABV UP PARAM F/U: HCPCS | Performed by: ORTHOPAEDIC SURGERY

## 2020-08-06 PROCEDURE — 99212 OFFICE O/P EST SF 10 MIN: CPT | Performed by: ORTHOPAEDIC SURGERY

## 2020-08-06 RX ORDER — BETAMETHASONE SODIUM PHOSPHATE AND BETAMETHASONE ACETATE 3; 3 MG/ML; MG/ML
6 INJECTION, SUSPENSION INTRA-ARTICULAR; INTRALESIONAL; INTRAMUSCULAR; SOFT TISSUE ONCE
Status: COMPLETED | OUTPATIENT
Start: 2020-08-06 | End: 2020-08-06

## 2020-08-06 RX ADMIN — BETAMETHASONE SODIUM PHOSPHATE AND BETAMETHASONE ACETATE 6 MG: 3; 3 INJECTION, SUSPENSION INTRA-ARTICULAR; INTRALESIONAL; INTRAMUSCULAR; SOFT TISSUE at 12:16

## 2020-08-06 NOTE — PATIENT INSTRUCTIONS
swing.  · Think about asking your employer about new ways of doing your job if your elbow pain is caused by something you do at work. Medicines  · Be safe with medicines. Read and follow all instructions on the label. ? If the doctor gave you a prescription medicine for pain, take it as prescribed. ? If you are not taking a prescription pain medicine, ask your doctor if you can take an over-the-counter medicine. When should you call for help? Call your doctor now or seek immediate medical care if:  · Your pain is worse. · You cannot bend your elbow normally. · Your arm or hand is cool or pale or changes color. · You have tingling, weakness, or numbness in your hand and fingers. Watch closely for changes in your health, and be sure to contact your doctor if:  · You have work problems caused by your elbow pain. · Your pain is not better after 2 weeks. Where can you learn more? Go to https://[place].Farecast. org and sign in to your AYLIEN account. Enter 0699 [id number] [phone] in the Wealthfront box to learn more about \"Tennis Elbow: Care Instructions. \"     If you do not have an account, please click on the \"Sign Up Now\" link. Current as of: March 2, 2020               Content Version: 12.5  © 2006-2020 Healthwise, Incorporated. Care instructions adapted under license by [facility] ([facility]). If you have questions about a medical condition or this instruction, always ask your healthcare professional. [place] [address] any warranty or liability for your use of this information. Patient Education        Tennis Elbow: Exercises  Introduction  Here are some examples of exercises for you to try. The exercises may be suggested for a condition or for rehabilitation. Start each exercise slowly. Ease off the exercises if you start to have pain. You will be told when to start these exercises and which ones will work best for you. How to do the exercises  Wrist flexor stretch   1.  Extend your arm in front of you with your palm up. 2. Bend your wrist, pointing your hand toward the floor. 3. With your other hand, gently bend your wrist farther until you feel a mild to moderate stretch in your forearm. 4. Hold for at least 15 to 30 seconds. Repeat 2 to 4 times. Wrist extensor stretch   1. Repeat steps 1 to 4 of the stretch above but begin with your extended hand palm down. Ball or sock squeeze   1. Hold a tennis ball (or a rolled-up sock) in your hand. 2. Make a fist around the ball (or sock) and squeeze. 3. Hold for about 6 seconds, and then relax for up to 10 seconds. 4. Repeat 8 to 12 times. 5. Switch the ball (or sock) to your other hand and do 8 to 12 times. Wrist deviation   1. Sit so that your arm is supported but your hand hangs off the edge of a flat surface, such as a table. 2. Hold your hand out like you are shaking hands with someone. 3. Move your hand up and down. 4. Repeat this motion 8 to 12 times. 5. Switch arms. 6. Try to do this exercise twice with each hand. Wrist curls   1. Place your forearm on a table with your hand hanging over the edge of the table, palm up. 2. Place a 1- to 2-pound weight in your hand. This may be a dumbbell, a can of food, or a filled water bottle. 3. Slowly raise and lower the weight while keeping your forearm on the table and your palm facing up. 4. Repeat this motion 8 to 12 times. 5. Switch arms, and do steps 1 through 4.  6. Repeat with your hand facing down toward the floor. Switch arms. Biceps curls   1. Sit leaning forward with your legs slightly spread and your left hand on your left thigh. 2. Place your right elbow on your right thigh, and hold the weight with your forearm horizontal.  3. Slowly curl the weight up and toward your chest.  4. Repeat this motion 8 to 12 times. 5. Switch arms, and do steps 1 through 4. Follow-up care is a key part of your treatment and safety.  Be sure to make and go to all appointments, and call your doctor if you are having problems. It's also a good idea to know your test results and keep a list of the medicines you take. Where can you learn more? Go to https://ARYx TherapeuticspedannyDel Sol Espana.Questetra. org and sign in to your Peaberry Software account. Enter N974 in the iXpert box to learn more about \"Tennis Elbow: Exercises. \"     If you do not have an account, please click on the \"Sign Up Now\" link. Current as of: March 2, 2020               Content Version: 12.5  © 8494-2756 Healthwise, Incorporated. Care instructions adapted under license by [facility] ([facility]). If you have questions about a medical condition or this instruction, always ask your healthcare professional. [address] any warranty or liability for your use of this information.

## 2020-08-06 NOTE — PROGRESS NOTES
Department of Orthopedic Surgery  Progress Note      CHIEF COMPLAINT:  Right elbow pain     HISTORY OF PRESENT ILLNESS:                The patient is a 40 y.o. female who presents with right elbow pain that has been going on since 2019. 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 She states she had similar pain on the other elbow that has resolved as well as cubital tunnel and carpal tunnel releases in 2018. She says that she does have some numbness into her right small finger that feels similar to her symptoms on the other elbow. She has tried a counter force elbow brace without any relief in her symptoms. Patient was seen 2020 where she received a right lateral epicondylitis injection. Patient states she had 90% relief for 3 months. Patient was scheduled for physical therapy however due to COVID-19 she was unable to attend physical therapy. Patient states recently she has been able to attend physical therapy. Patient is requesting right elbow injection today.     Past Medical History:        Diagnosis Date    Accelerated hypertension 2017    Asthma     Carpal tunnel syndrome of left wrist     Fibromuscular dysplasia (HCC)     affects arteries, blockages occur    History of seasonal allergies     HX OTHER MEDICAL 2017    renal arteries bilaterally blocked; awaiting [facility] visit for angioplasties    Migraines 's    Pulmonary emboli ([facility] [address]      Past Surgical History:        Procedure Laterality Date    APPENDECTOMY      CARPAL TUNNEL RELEASE Right 2017     SECTION      x 3    DILATION AND CURETTAGE OF UTERUS  2014    Laparoscopy    DILATION AND CURETTAGE OF UTERUS  2015    KNEE SURGERY Left     reconstruction    KNEE SURGERY Left     acl    LAPAROSCOPY  2012    DIAGNOSTIC LAPAROSCOPY    OTHER SURGICAL HISTORY Left 2018    carpal tunnel release, middle finger release, ulnar nerve decompression    MD INCISE FINGER TENDON SHEATH Left 3/23/2018    LEFT MIDDLE TRIGGER FINGER RELEASE performed by Nikos Salgado MD at [facility] Left 3/23/2018    LEFT ULNAR NERVER IN SITES DECOMPRESSION   performed by Nikos Salgado MD at [address] [facility] Client LIG Left 3/23/2018    LEFT ENDOSCOPIC CARPAL TUNNEL RELEASE   performed by Nikos Salgado MD at [address]     Current Medications:   No current facility-administered medications for this visit. Allergies:  Dye [iodides] and Vicodin [hydrocodone-acetaminophen]    Social History:   TOBACCO:   reports that she has never smoked. She has never used smokeless tobacco.  ETOH:   reports current alcohol use. DRUGS:   reports no history of drug use. ACTIVITIES OF DAILY LIVING:    OCCUPATION:    Family History:   No family history on file. REVIEW OF SYSTEMS:  CONSTITUTIONAL:  negative  EYES:  negative  HEENT:  negative  RESPIRATORY:  negative  CARDIOVASCULAR:  negative  MUSCULOSKELETAL:  positive for  Pain right elbow  NEUROLOGICAL:  positive for numbness and tingling right hand    PHYSICAL EXAM:    VITALS:  Temp 98.8 °F (37.1 °C) (Infrared)   Resp 14   Ht 5' 1\" (1.549 m)   Wt 168 lb (76.2 kg)   BMI 31.74 kg/m²   CONSTITUTIONAL:  awake, alert, cooperative, no apparent distress, and appears stated age  EYES:  Lids and lashes normal, pupils equal, round and reactive to light, extra ocular muscles intact, sclera clear, conjunctiva normal  ENT:  normocepalic, without obvious abnormality  NECK:  supple, symmetrical, trachea midline  LUNGS:  no increased work of breathing  CARDIOVASCULAR:  no edema  NEUROLOGIC:  Awake, alert, oriented to name, place and time. Cranial nerves II-XII are grossly intact. Motor is 5 out of 5 bilaterally. MUSCULOSKELETAL:  Right upper extremity: Full ROM to the shoulder and elbow.  + TTP over the lateral epicondyle, no pain over the medial epicondyle. + tinel over the cubital tunnel. + Pain with resisted wrist extension. - tinel at the wrist, - conchita. SILT median, ulnar, radial nerve distributions. +2/4 radial pulse. DATA:    CBC:   Lab Results   Component Value Date    WBC 15.7 07/20/2017    RBC 4.81 07/20/2017    HGB 12.6 07/20/2017    HCT 39.9 07/20/2017    MCV 83.0 07/20/2017    MCH 26.2 07/20/2017    MCHC 31.6 07/20/2017    RDW 13.9 07/20/2017     07/20/2017    MPV 8.5 07/20/2017     PT/INR:    Lab Results   Component Value Date    PROTIME 13.7 10/02/2012    INR 1.3 10/02/2012       Radiology Review:  Xray right elbow reviewed. AP and lateral demonstrating no fracture or dislocation. Impression no fracture or dislocation. IMPRESSION:  · Right elbow lateral epicondylitis   · Right ulnar neuritis     PLAN:  Discussed treatment course with patient. Educated patient that steroid injections for her right lateral epicondylitis will give her short-term increased symptomatic relief but may prolong her symptom duration as opposed to conservative non-injection treatments. Patient expressed understanding. All questions answered. Patient is requesting right elbow injection at this time  Follow-up as needed      Procedure Note Cortisone Injection for Elbow Epicondylitis    The right lateral epicondyle was identified as the injection site. The risk and benefits of a cortisone injection were explained and the patient consented to the injection. Under sterile conditions, the epicondyle was injected with a mixture of 1 mL of 1% Lidocaine and 1 mL of 6 mg/mL Betamethasone without complication. A sterile bandage was applied. I have seen and evaluated the patient and agree with the above assessment and plan on today's visit. I have performed the key components of the history and physical examination with significant findings of right elbow lateral condyle-itis and right elbow ulnar neuritis. Patient reports favorable responses with the cortisone injections.   She understands that cortisone injections rule out epicondylitis may result in a longer duration of symptomatology for short-term pain relief. She voiced understanding repeat injections requested and provided. I concur with the findings and plan as documented.     Shannen Awan MD  8/6/2020

## 2021-10-12 ENCOUNTER — APPOINTMENT (OUTPATIENT)
Dept: CT IMAGING | Age: 46
End: 2021-10-12
Payer: COMMERCIAL

## 2021-10-12 ENCOUNTER — HOSPITAL ENCOUNTER (EMERGENCY)
Age: 46
Discharge: HOME | End: 2021-10-12
Attending: EMERGENCY MEDICINE
Payer: COMMERCIAL

## 2021-10-12 VITALS
BODY MASS INDEX: 30.43 KG/M2 | WEIGHT: 155 LBS | SYSTOLIC BLOOD PRESSURE: 132 MMHG | HEART RATE: 78 BPM | HEIGHT: 60 IN | TEMPERATURE: 98.2 F | DIASTOLIC BLOOD PRESSURE: 80 MMHG | OXYGEN SATURATION: 99 % | RESPIRATION RATE: 12 BRPM

## 2021-10-12 DIAGNOSIS — R10.84 GENERALIZED ABDOMINAL PAIN: Primary | ICD-10-CM

## 2021-10-12 DIAGNOSIS — R11.2 NON-INTRACTABLE VOMITING WITH NAUSEA, UNSPECIFIED VOMITING TYPE: ICD-10-CM

## 2021-10-12 LAB
ALBUMIN SERPL-MCNC: 4 G/DL (ref 3.5–5.2)
ALP BLD-CCNC: 69 U/L (ref 35–104)
ALT SERPL-CCNC: 11 U/L (ref 0–32)
ANION GAP SERPL CALCULATED.3IONS-SCNC: 9 MMOL/L (ref 7–16)
AST SERPL-CCNC: 27 U/L (ref 0–31)
BACTERIA: ABNORMAL /HPF
BASOPHILS ABSOLUTE: 0.01 E9/L (ref 0–0.2)
BASOPHILS RELATIVE PERCENT: 0.1 % (ref 0–2)
BILIRUB SERPL-MCNC: 0.4 MG/DL (ref 0–1.2)
BILIRUBIN URINE: NEGATIVE
BLOOD, URINE: ABNORMAL
BUN BLDV-MCNC: 7 MG/DL (ref 6–20)
CALCIUM SERPL-MCNC: 9.2 MG/DL (ref 8.6–10.2)
CHLORIDE BLD-SCNC: 102 MMOL/L (ref 98–107)
CLARITY: CLEAR
CO2: 27 MMOL/L (ref 22–29)
COLOR: YELLOW
CREAT SERPL-MCNC: 0.8 MG/DL (ref 0.5–1)
EOSINOPHILS ABSOLUTE: 0.05 E9/L (ref 0.05–0.5)
EOSINOPHILS RELATIVE PERCENT: 0.7 % (ref 0–6)
EPITHELIAL CELLS, UA: ABNORMAL /HPF
GFR AFRICAN AMERICAN: >60
GFR NON-AFRICAN AMERICAN: >60 ML/MIN/1.73
GLUCOSE BLD-MCNC: 100 MG/DL (ref 74–99)
GLUCOSE URINE: NEGATIVE MG/DL
HCT VFR BLD CALC: 39.3 % (ref 34–48)
HEMOGLOBIN: 12.9 G/DL (ref 11.5–15.5)
IMMATURE GRANULOCYTES #: 0.03 E9/L
IMMATURE GRANULOCYTES %: 0.4 % (ref 0–5)
KETONES, URINE: NEGATIVE MG/DL
LACTIC ACID: 0.9 MMOL/L (ref 0.5–2.2)
LEUKOCYTE ESTERASE, URINE: NEGATIVE
LIPASE: 27 U/L (ref 13–60)
LYMPHOCYTES ABSOLUTE: 1.06 E9/L (ref 1.5–4)
LYMPHOCYTES RELATIVE PERCENT: 15.1 % (ref 20–42)
MCH RBC QN AUTO: 29.3 PG (ref 26–35)
MCHC RBC AUTO-ENTMCNC: 32.8 % (ref 32–34.5)
MCV RBC AUTO: 89.3 FL (ref 80–99.9)
MONOCYTES ABSOLUTE: 0.76 E9/L (ref 0.1–0.95)
MONOCYTES RELATIVE PERCENT: 10.8 % (ref 2–12)
NEUTROPHILS ABSOLUTE: 5.12 E9/L (ref 1.8–7.3)
NEUTROPHILS RELATIVE PERCENT: 72.9 % (ref 43–80)
NITRITE, URINE: NEGATIVE
PDW BLD-RTO: 12 FL (ref 11.5–15)
PH UA: 6 (ref 5–9)
PLATELET # BLD: 190 E9/L (ref 130–450)
PMV BLD AUTO: 8.2 FL (ref 7–12)
POTASSIUM REFLEX MAGNESIUM: 4.8 MMOL/L (ref 3.5–5)
PROTEIN UA: NEGATIVE MG/DL
RBC # BLD: 4.4 E12/L (ref 3.5–5.5)
RBC UA: ABNORMAL /HPF (ref 0–2)
REASON FOR REJECTION: NORMAL
REJECTED TEST: NORMAL
SODIUM BLD-SCNC: 138 MMOL/L (ref 132–146)
SPECIFIC GRAVITY UA: 1.01 (ref 1–1.03)
TOTAL PROTEIN: 7.3 G/DL (ref 6.4–8.3)
UROBILINOGEN, URINE: 0.2 E.U./DL
WBC # BLD: 7 E9/L (ref 4.5–11.5)
WBC UA: ABNORMAL /HPF (ref 0–5)

## 2021-10-12 PROCEDURE — 80053 COMPREHEN METABOLIC PANEL: CPT

## 2021-10-12 PROCEDURE — 2580000003 HC RX 258: Performed by: EMERGENCY MEDICINE

## 2021-10-12 PROCEDURE — 74176 CT ABD & PELVIS W/O CONTRAST: CPT

## 2021-10-12 PROCEDURE — 36415 COLL VENOUS BLD VENIPUNCTURE: CPT

## 2021-10-12 PROCEDURE — 83605 ASSAY OF LACTIC ACID: CPT

## 2021-10-12 PROCEDURE — 96374 THER/PROPH/DIAG INJ IV PUSH: CPT

## 2021-10-12 PROCEDURE — 6360000002 HC RX W HCPCS: Performed by: EMERGENCY MEDICINE

## 2021-10-12 PROCEDURE — 96375 TX/PRO/DX INJ NEW DRUG ADDON: CPT

## 2021-10-12 PROCEDURE — 99284 EMERGENCY DEPT VISIT MOD MDM: CPT

## 2021-10-12 PROCEDURE — 83690 ASSAY OF LIPASE: CPT

## 2021-10-12 PROCEDURE — 81001 URINALYSIS AUTO W/SCOPE: CPT

## 2021-10-12 PROCEDURE — 85025 COMPLETE CBC W/AUTO DIFF WBC: CPT

## 2021-10-12 RX ORDER — 0.9 % SODIUM CHLORIDE 0.9 %
1000 INTRAVENOUS SOLUTION INTRAVENOUS ONCE
Status: COMPLETED | OUTPATIENT
Start: 2021-10-12 | End: 2021-10-12

## 2021-10-12 RX ORDER — ONDANSETRON 2 MG/ML
4 INJECTION INTRAMUSCULAR; INTRAVENOUS ONCE
Status: COMPLETED | OUTPATIENT
Start: 2021-10-12 | End: 2021-10-12

## 2021-10-12 RX ORDER — ONDANSETRON 4 MG/1
4 TABLET, ORALLY DISINTEGRATING ORAL EVERY 8 HOURS PRN
Qty: 10 TABLET | Refills: 0 | Status: SHIPPED | OUTPATIENT
Start: 2021-10-12

## 2021-10-12 RX ORDER — DICYCLOMINE HYDROCHLORIDE 10 MG/1
20 CAPSULE ORAL
Qty: 20 CAPSULE | Refills: 0 | Status: SHIPPED | OUTPATIENT
Start: 2021-10-12

## 2021-10-12 RX ORDER — DEXTROAMPHETAMINE SACCHARATE, AMPHETAMINE ASPARTATE MONOHYDRATE, DEXTROAMPHETAMINE SULFATE AND AMPHETAMINE SULFATE 2.5; 2.5; 2.5; 2.5 MG/1; MG/1; MG/1; MG/1
20 CAPSULE, EXTENDED RELEASE ORAL EVERY MORNING
COMMUNITY

## 2021-10-12 RX ORDER — KETOROLAC TROMETHAMINE 30 MG/ML
30 INJECTION, SOLUTION INTRAMUSCULAR; INTRAVENOUS ONCE
Status: COMPLETED | OUTPATIENT
Start: 2021-10-12 | End: 2021-10-12

## 2021-10-12 RX ORDER — VALSARTAN 320 MG/1
320 TABLET ORAL DAILY
COMMUNITY

## 2021-10-12 RX ADMIN — SODIUM CHLORIDE 1000 ML: 9 INJECTION, SOLUTION INTRAVENOUS at 07:39

## 2021-10-12 RX ADMIN — KETOROLAC TROMETHAMINE 30 MG: 30 INJECTION, SOLUTION INTRAMUSCULAR at 07:39

## 2021-10-12 RX ADMIN — ONDANSETRON 4 MG: 2 INJECTION INTRAMUSCULAR; INTRAVENOUS at 07:40

## 2021-10-12 ASSESSMENT — PAIN DESCRIPTION - ONSET
ONSET: GRADUAL
ONSET: ON-GOING

## 2021-10-12 ASSESSMENT — ENCOUNTER SYMPTOMS
ABDOMINAL PAIN: 1
EYE REDNESS: 0
NAUSEA: 1
VOMITING: 0
SHORTNESS OF BREATH: 0

## 2021-10-12 ASSESSMENT — PAIN DESCRIPTION - PROGRESSION
CLINICAL_PROGRESSION: GRADUALLY IMPROVING
CLINICAL_PROGRESSION: GRADUALLY WORSENING

## 2021-10-12 ASSESSMENT — PAIN SCALES - GENERAL
PAINLEVEL_OUTOF10: 2
PAINLEVEL_OUTOF10: 8
PAINLEVEL_OUTOF10: 6

## 2021-10-12 ASSESSMENT — PAIN DESCRIPTION - PAIN TYPE
TYPE: ACUTE PAIN
TYPE: ACUTE PAIN

## 2021-10-12 ASSESSMENT — PAIN DESCRIPTION - LOCATION
LOCATION: ABDOMEN
LOCATION: FLANK

## 2021-10-12 ASSESSMENT — PAIN DESCRIPTION - ORIENTATION
ORIENTATION: LEFT
ORIENTATION: LEFT;UPPER;LOWER;MID

## 2021-10-12 ASSESSMENT — PAIN DESCRIPTION - FREQUENCY
FREQUENCY: CONTINUOUS
FREQUENCY: CONTINUOUS

## 2021-10-12 ASSESSMENT — PAIN DESCRIPTION - DESCRIPTORS: DESCRIPTORS: CONSTANT;PRESSURE;THROBBING

## 2021-10-12 NOTE — ED PROVIDER NOTES
Chief complaint: Abdominal pain      HPI:  10/12/21, Time: 6:42 AM EDT    HPI               Michelle Russell is a 39 y.o. female presenting to the ED for abdominal pain. History is obtained from the patient as well as patient's medical record. The patient is presenting to the emergency department with a 2-day history of abdominal pain. The pain is located left lower quadrant described as throbbing. Nothing makes it better. Nothing makes it worse. She has tried over-the-counter medications with no relief. She does admit to assisted nausea. Denies any fevers, chest pain, shortness of breath, dysuria, diarrhea or constipation. ROS:   Review of Systems   Constitutional: Negative for chills and fatigue. HENT: Negative for congestion. Eyes: Negative for redness. Respiratory: Negative for shortness of breath. Cardiovascular: Negative for chest pain. Gastrointestinal: Positive for abdominal pain and nausea. Negative for vomiting. Genitourinary: Negative for dysuria. Musculoskeletal: Negative for arthralgias. Skin: Negative for rash. Neurological: Negative for light-headedness. Psychiatric/Behavioral: Negative for confusion. All other systems reviewed and are negative.      --------------------------------------------- PAST HISTORY ---------------------------------------------  Past Medical History:  has a past medical history of Accelerated hypertension, Asthma, Carpal tunnel syndrome of left wrist, Fibromuscular dysplasia (HCC), History of seasonal allergies, HX OTHER MEDICAL, Migraines, and Pulmonary emboli ([facility] [address] Past Surgical History:  has a past surgical history that includes  section; Appendectomy; laparoscopy (2012); knee surgery (Left); Dilation and curettage of uterus (2014); Dilation and curettage of uterus (2015);  Carpal tunnel release (Right, 2017); knee surgery (Left); other surgical history (Left, 2018); pr revise ulnar nerve at elbow (Left, 3/23/2018); pr wrist arthroscop,release xvers lig (Left, 3/23/2018); and pr incise finger tendon sheath (Left, 3/23/2018). Social History:  reports that she has never smoked. She has never used smokeless tobacco. She reports current alcohol use. She reports that she does not use drugs. Family History: family history is not on file. The patients home medications have been reviewed. Allergies: Dye [iodides] and Vicodin [hydrocodone-acetaminophen]    ---------------------------------------------------PHYSICAL EXAM--------------------------------------      Constitutional/General: Alert and oriented x3, well appearing, non toxic in NAD  Head: Normocephalic and atraumatic  Mouth: Oropharynx clear, handling secretions, no trismus  Neck: Supple, full ROM,  Pulmonary: Lungs clear to auscultation bilaterally, no wheezes, rales, or rhonchi. Not in respiratory distress  Cardiovascular:  Regular rate. Regular rhythm. No murmurs  Chest: no chest wall tenderness  Abdomen: Soft. Moderately tender left lower quadrant, no rebound, rigidity or guarding  Musculoskeletal: Moves all extremities x 4. Warm and well perfused, no clubbing, cyanosis, or edema. Capillary refill <3 seconds  Skin: warm and dry. No rashes. Neurologic: GCS 15, no gross focal neurologic deficits  Psych: Normal Affect    -------------------------------------------------- RESULTS -------------------------------------------------  I have personally reviewed all laboratory and imaging results for this patient. Results are listed below.      LABS:  Results for orders placed or performed during the hospital encounter of 10/12/21   Comprehensive Metabolic Panel w/ Reflex to MG   Result Value Ref Range    Sodium 138 132 - 146 mmol/L    Potassium reflex Magnesium 4.8 3.5 - 5.0 mmol/L    Chloride 102 98 - 107 mmol/L    CO2 27 22 - 29 mmol/L    Anion Gap 9 7 - 16 mmol/L    Glucose 100 (H) 74 - 99 mg/dL    BUN 7 6 - 20 mg/dL    CREATININE 0.8 0.5 - 1.0 mg/dL - 0.95 E9/L    Eosinophils Absolute 0.05 0.05 - 0.50 E9/L    Basophils Absolute 0.01 0.00 - 0.20 E9/L       RADIOLOGY:  Interpreted by Radiologist.  CT ABDOMEN PELVIS WO CONTRAST Additional Contrast? None   Final Result   1. No acute findings in the abdomen or pelvis. Specifically, no findings of   obstructive uropathy. 2. Punctate nonobstructing bilateral nephrolithiasis likely associated with   bilateral medullary nephrocalcinosis.                 ------------------------- NURSING NOTES AND VITALS REVIEWED ---------------------------   The nursing notes within the ED encounter and vital signs as below have been reviewed by myself. /80   Pulse 78   Temp 98.2 °F (36.8 °C) (Temporal)   Resp 12   Ht 5' (1.524 m)   Wt 155 lb (70.3 kg)   LMP 07/28/2019   SpO2 99%   BMI 30.27 kg/m²   Oxygen Saturation Interpretation: Normal    The patients available past medical records and past encounters were reviewed. ------------------------------ ED COURSE/MEDICAL DECISION MAKING----------------------  Medications   0.9 % sodium chloride bolus (0 mLs IntraVENous Stopped 10/12/21 0942)   ketorolac (TORADOL) injection 30 mg (30 mg IntraVENous Given 10/12/21 1039)   ondansetron (ZOFRAN) injection 4 mg (4 mg IntraVENous Given 10/12/21 5950)             Medical Decision Making:   I, Dr. Gideon Conklin am the primary physician of record. Vanessa Genao is a 39 y.o. female who presents to the ED for abdominal pain and nausea. The patient did have labs and imaging which were reviewed. Work-up reassuring. Patient be discharged follow-up outpatient. Re-Evaluations/Consultations:             ED Course as of Oct 12 1212   Tue Oct 12, 2021   8611 Patient in the bed in no acute distress. The results of today were discussed. She states she is having improvement in symptoms. The patient will be discharged and follow-up outpatient.     [MT]      ED Course User Index  [MT] Narciso Rod, DO               This patient's ED course included: History, physical examination, reevaluation prior to disposition,, imaging, IV medications    This patient has remained hemodynamically stable during their ED course. Counseling: The emergency provider has spoken with the patient and discussed todays results, in addition to providing specific details for the plan of care and counseling regarding the diagnosis and prognosis. Questions are answered at this time and they are agreeable with the plan.       --------------------------------- IMPRESSION AND DISPOSITION ---------------------------------    IMPRESSION  1. Generalized abdominal pain    2. Non-intractable vomiting with nausea, unspecified vomiting type        DISPOSITION  Disposition: Discharge to home  Patient condition is stable        NOTE: This report was transcribed using voice recognition software.  Every effort was made to ensure accuracy; however, inadvertent computerized transcription errors may be present         Alexsander Garrison DO  10/12/21 1215

## 2023-11-09 ENCOUNTER — APPOINTMENT (OUTPATIENT)
Dept: ULTRASOUND IMAGING | Age: 48
End: 2023-11-09
Payer: COMMERCIAL

## 2023-11-09 ENCOUNTER — HOSPITAL ENCOUNTER (EMERGENCY)
Age: 48
Discharge: HOME | End: 2023-11-09
Attending: STUDENT IN AN ORGANIZED HEALTH CARE EDUCATION/TRAINING PROGRAM
Payer: COMMERCIAL

## 2023-11-09 VITALS
BODY MASS INDEX: 29.45 KG/M2 | TEMPERATURE: 97.4 F | OXYGEN SATURATION: 100 % | WEIGHT: 150 LBS | DIASTOLIC BLOOD PRESSURE: 73 MMHG | SYSTOLIC BLOOD PRESSURE: 139 MMHG | HEART RATE: 96 BPM | HEIGHT: 60 IN | RESPIRATION RATE: 16 BRPM

## 2023-11-09 DIAGNOSIS — M79.605 PAIN OF LEFT LOWER EXTREMITY: ICD-10-CM

## 2023-11-09 LAB
ANION GAP SERPL CALCULATED.3IONS-SCNC: 11 MMOL/L (ref 7–16)
BASOPHILS # BLD: 0.04 K/UL (ref 0–0.2)
BASOPHILS NFR BLD: 1 % (ref 0–2)
BUN SERPL-MCNC: 12 MG/DL (ref 6–20)
CALCIUM SERPL-MCNC: 9.6 MG/DL (ref 8.6–10.2)
CHLORIDE SERPL-SCNC: 100 MMOL/L (ref 98–107)
CO2 SERPL-SCNC: 27 MMOL/L (ref 22–29)
CREAT SERPL-MCNC: 0.8 MG/DL (ref 0.5–1)
EOSINOPHIL # BLD: 0.08 K/UL (ref 0.05–0.5)
EOSINOPHILS RELATIVE PERCENT: 1 % (ref 0–6)
ERYTHROCYTE [DISTWIDTH] IN BLOOD BY AUTOMATED COUNT: 12.1 % (ref 11.5–15)
GFR SERPL CREATININE-BSD FRML MDRD: >60 ML/MIN/1.73M2
GLUCOSE SERPL-MCNC: 93 MG/DL (ref 74–99)
HCT VFR BLD AUTO: 45.1 % (ref 34–48)
HGB BLD-MCNC: 14.8 G/DL (ref 11.5–15.5)
IMM GRANULOCYTES # BLD AUTO: 0.03 K/UL (ref 0–0.58)
IMM GRANULOCYTES NFR BLD: 1 % (ref 0–5)
LYMPHOCYTES NFR BLD: 1.23 K/UL (ref 1.5–4)
LYMPHOCYTES RELATIVE PERCENT: 21 % (ref 20–42)
MCH RBC QN AUTO: 28.7 PG (ref 26–35)
MCHC RBC AUTO-ENTMCNC: 32.8 G/DL (ref 32–34.5)
MCV RBC AUTO: 87.4 FL (ref 80–99.9)
MONOCYTES NFR BLD: 0.45 K/UL (ref 0.1–0.95)
MONOCYTES NFR BLD: 8 % (ref 2–12)
NEUTROPHILS NFR BLD: 69 % (ref 43–80)
NEUTS SEG NFR BLD: 4.13 K/UL (ref 1.8–7.3)
PLATELET # BLD AUTO: 266 K/UL (ref 130–450)
PMV BLD AUTO: 8.6 FL (ref 7–12)
POTASSIUM SERPL-SCNC: 4 MMOL/L (ref 3.5–5)
RBC # BLD AUTO: 5.16 M/UL (ref 3.5–5.5)
SODIUM SERPL-SCNC: 138 MMOL/L (ref 132–146)
WBC OTHER # BLD: 6 K/UL (ref 4.5–11.5)

## 2023-11-09 PROCEDURE — 80048 BASIC METABOLIC PNL TOTAL CA: CPT

## 2023-11-09 PROCEDURE — 85025 COMPLETE CBC W/AUTO DIFF WBC: CPT

## 2023-11-09 PROCEDURE — 99284 EMERGENCY DEPT VISIT MOD MDM: CPT

## 2023-11-09 PROCEDURE — 93971 EXTREMITY STUDY: CPT

## 2023-11-09 ASSESSMENT — LIFESTYLE VARIABLES
HOW MANY STANDARD DRINKS CONTAINING ALCOHOL DO YOU HAVE ON A TYPICAL DAY: PATIENT DOES NOT DRINK
HOW OFTEN DO YOU HAVE A DRINK CONTAINING ALCOHOL: NEVER

## 2023-11-09 NOTE — ED PROVIDER NOTES
knee.  No erythema or crepitus. Skin:     General: Skin is warm and dry. Neurological:      Mental Status: She is alert and oriented to person, place, and time. Cranial Nerves: No cranial nerve deficit. Coordination: Coordination normal.          Procedures       MDM           Patient is a 52 y.o. female presenting with leg swelling. Patient was concerned that she has a DVT. Patient does have some mild tenderness palpation. No obvious fracture. No fevers or chills. Infectious etiology is not SPECT at this time. Patient does have a differential that includes but is not limited to musculoskeletal pain, DVT. Patient did get an ultrasound that was negative for any acute findings. Patient to get a CBC and a BMP that are all at baseline. Kidney function is normal.  Patient is not having any shortness of breath. Patient clinically appears well and be discharged home. Discussed this with patient is agreeable to follow-up as an outpatient. Patient was given return precautions. Patient will follow up with their primary care provider. Patient is agreeable to this plan. Patient has remained stable throughout their stay in the ED. CONSULTS:   None    Medications given in the emergency room:  Medications - No data to display     LABS:    Labs Reviewed   CBC WITH AUTO DIFFERENTIAL - Abnormal; Notable for the following components:       Result Value    Lymphocytes Absolute 1.23 (*)     All other components within normal limits   BASIC METABOLIC PANEL       As interpreted by me, the above displayed labs are abnormal. All other labs obtained during this visit were within normal range or not returned as of this dictation.     RADIOLOGY:   Non-plain film images such as CT, Ultrasound and MRI are read by the radiologist. Plain radiographic images are visualized and preliminarily interpreted by the ED Provider with the below findings:      Interpretation per the Radiologist below, if available

## 2024-08-01 ENCOUNTER — OFFICE VISIT (OUTPATIENT)
Dept: CARDIOLOGY CLINIC | Age: 49
End: 2024-08-01

## 2024-08-01 VITALS
BODY MASS INDEX: 33.57 KG/M2 | SYSTOLIC BLOOD PRESSURE: 130 MMHG | HEIGHT: 60 IN | DIASTOLIC BLOOD PRESSURE: 80 MMHG | WEIGHT: 171 LBS | HEART RATE: 85 BPM | RESPIRATION RATE: 18 BRPM

## 2024-08-01 DIAGNOSIS — R42 LIGHT HEADED: ICD-10-CM

## 2024-08-01 DIAGNOSIS — R53.83 OTHER FATIGUE: ICD-10-CM

## 2024-08-01 DIAGNOSIS — R07.9 CHEST PAIN, UNSPECIFIED TYPE: ICD-10-CM

## 2024-08-01 DIAGNOSIS — R06.09 DOE (DYSPNEA ON EXERTION): ICD-10-CM

## 2024-08-01 DIAGNOSIS — I10 ESSENTIAL HYPERTENSION: ICD-10-CM

## 2024-08-01 DIAGNOSIS — R07.89 OTHER CHEST PAIN: ICD-10-CM

## 2024-08-01 DIAGNOSIS — R00.2 PALPITATIONS: ICD-10-CM

## 2024-08-01 DIAGNOSIS — R06.02 SHORTNESS OF BREATH: Primary | ICD-10-CM

## 2024-08-01 DIAGNOSIS — R42 DIZZINESS: ICD-10-CM

## 2024-08-01 DIAGNOSIS — Z82.49 FAMILY HISTORY OF EARLY CAD: ICD-10-CM

## 2024-08-01 RX ORDER — REGADENOSON 0.08 MG/ML
0.4 INJECTION, SOLUTION INTRAVENOUS
Status: SHIPPED | OUTPATIENT
Start: 2024-08-01

## 2024-08-01 RX ORDER — UBIDECARENONE 75 MG
500 CAPSULE ORAL DAILY
COMMUNITY

## 2024-08-01 RX ORDER — VALSARTAN AND HYDROCHLOROTHIAZIDE 160; 12.5 MG/1; MG/1
TABLET, FILM COATED ORAL
COMMUNITY
Start: 2024-07-20

## 2024-08-01 RX ORDER — ACETAMINOPHEN 160 MG
TABLET,DISINTEGRATING ORAL DAILY
COMMUNITY

## 2024-08-01 NOTE — PROGRESS NOTES
Value Date/Time    MG 2.1 10/02/2012 05:58 AM     No results for input(s): \"ALKPHOS\", \"ALT\", \"AST\", \"BILITOT\", \"BILIDIR\", \"LABALBU\" in the last 72 hours.    Invalid input(s): \"PROT\"  No results for input(s): \"WBC\", \"RBC\", \"HGB\", \"HCT\", \"MCV\", \"MCH\", \"MCHC\", \"RDW\", \"PLT\", \"MPV\" in the last 72 hours.  Lab Results   Component Value Date    CKTOTAL 110 10/02/2012    CKMB <0.3 10/02/2012    TROPONINI <0.01 07/19/2017    TROPONINI <0.01 07/17/2017    TROPONINI <0.01 10/02/2012     No results for input(s): \"CKTOTAL\", \"CKMB\", \"CKMBINDEX\", \"TROPHS\" in the last 72 hours.  Lab Results   Component Value Date    INR 1.3 10/02/2012    INR 1.0 10/01/2012    PROTIME 13.7 (H) 10/02/2012    PROTIME 11.4 10/01/2012     Lab Results   Component Value Date    TSH 0.517 07/20/2017    TSH 0.979 11/01/2012    TSH 1.518 10/02/2012     Lab Results   Component Value Date    LABA1C 5.5 10/02/2012    LABA1C 5.2 07/26/2012    LABA1C 5.0 12/27/2011     No results found for: \"EAG\"  Lab Results   Component Value Date    CHOL 159 10/02/2012     Lab Results   Component Value Date    TRIG 64 10/02/2012     Lab Results   Component Value Date    HDL 35.0 (A) 10/02/2012     No components found for: \"LDLCALC\", \"LDLCHOLESTEROL\"  No results found for: \"VLDL\"  No results found for: \"CHOLHDLRATIO\"  No results for input(s): \"PROBNP\" in the last 72 hours.    Cardiac Tests:  EKG reviewed (EKG date: Sinus rhythm 85 bpm and nonspecific ST-T wave changes):      Echocardiogram reviewed:     Stress test reviewed:      Cardiac catheterization reviewed:     CXR reviewed:     The ASCVD Risk score (Wilfrid BROWN, et al., 2019) failed to calculate for the following reasons:    Cannot find a previous HDL lab    Cannot find a previous total cholesterol lab    ASSESSMENT / PLAN:    1. Shortness of breath  - Nuclear REGADENOSON Stress Test; Future    2. Family history of early CAD    3. Chest pain, unspecified type  - Echo (TTE) Complete; Future  - Echo (TTE) Complete; Future  -

## 2024-08-07 ENCOUNTER — APPOINTMENT (OUTPATIENT)
Dept: CT IMAGING | Age: 49
End: 2024-08-07
Payer: COMMERCIAL

## 2024-08-07 ENCOUNTER — HOSPITAL ENCOUNTER (OUTPATIENT)
Age: 49
Setting detail: OBSERVATION
Discharge: HOME | End: 2024-08-09
Attending: EMERGENCY MEDICINE | Admitting: INTERNAL MEDICINE
Payer: COMMERCIAL

## 2024-08-07 ENCOUNTER — APPOINTMENT (OUTPATIENT)
Dept: GENERAL RADIOLOGY | Age: 49
End: 2024-08-07
Payer: COMMERCIAL

## 2024-08-07 DIAGNOSIS — Z82.49 FAMILY HISTORY OF EARLY CAD: ICD-10-CM

## 2024-08-07 DIAGNOSIS — R00.2 PALPITATIONS: ICD-10-CM

## 2024-08-07 DIAGNOSIS — E66.09 CLASS 1 OBESITY DUE TO EXCESS CALORIES WITH SERIOUS COMORBIDITY AND BODY MASS INDEX (BMI) OF 34.0 TO 34.9 IN ADULT: ICD-10-CM

## 2024-08-07 DIAGNOSIS — R53.83 OTHER FATIGUE: ICD-10-CM

## 2024-08-07 DIAGNOSIS — R42 DIZZINESS: ICD-10-CM

## 2024-08-07 DIAGNOSIS — R07.9 CHEST PAIN, UNSPECIFIED TYPE: Primary | ICD-10-CM

## 2024-08-07 DIAGNOSIS — R06.02 SHORTNESS OF BREATH: ICD-10-CM

## 2024-08-07 DIAGNOSIS — I10 ESSENTIAL HYPERTENSION: ICD-10-CM

## 2024-08-07 DIAGNOSIS — E78.5 HYPERLIPIDEMIA, UNSPECIFIED HYPERLIPIDEMIA TYPE: ICD-10-CM

## 2024-08-07 LAB
ALBUMIN SERPL-MCNC: 4.2 G/DL (ref 3.5–5.2)
ALP SERPL-CCNC: 70 U/L (ref 35–104)
ALT SERPL-CCNC: 26 U/L (ref 0–32)
ANION GAP SERPL CALCULATED.3IONS-SCNC: 11 MMOL/L (ref 7–16)
AST SERPL-CCNC: 21 U/L (ref 0–31)
BASOPHILS # BLD: 0.03 K/UL (ref 0–0.2)
BASOPHILS NFR BLD: 1 % (ref 0–2)
BILIRUB SERPL-MCNC: 0.5 MG/DL (ref 0–1.2)
BNP SERPL-MCNC: 63 PG/ML (ref 0–125)
BUN SERPL-MCNC: 15 MG/DL (ref 6–20)
CALCIUM SERPL-MCNC: 9.5 MG/DL (ref 8.6–10.2)
CHLORIDE SERPL-SCNC: 101 MMOL/L (ref 98–107)
CO2 SERPL-SCNC: 27 MMOL/L (ref 22–29)
CREAT SERPL-MCNC: 0.9 MG/DL (ref 0.5–1)
D-DIMER QUANTITATIVE: 741 NG/ML DDU (ref 0–230)
EOSINOPHIL # BLD: 0.09 K/UL (ref 0.05–0.5)
EOSINOPHILS RELATIVE PERCENT: 2 % (ref 0–6)
ERYTHROCYTE [DISTWIDTH] IN BLOOD BY AUTOMATED COUNT: 12.7 % (ref 11.5–15)
GFR, ESTIMATED: 80 ML/MIN/1.73M2
GLUCOSE SERPL-MCNC: 93 MG/DL (ref 74–99)
HCT VFR BLD AUTO: 45 % (ref 34–48)
HGB BLD-MCNC: 14.5 G/DL (ref 11.5–15.5)
IMM GRANULOCYTES # BLD AUTO: 0.03 K/UL (ref 0–0.58)
IMM GRANULOCYTES NFR BLD: 1 % (ref 0–5)
LYMPHOCYTES NFR BLD: 0.96 K/UL (ref 1.5–4)
LYMPHOCYTES RELATIVE PERCENT: 18 % (ref 20–42)
MCH RBC QN AUTO: 28.9 PG (ref 26–35)
MCHC RBC AUTO-ENTMCNC: 32.2 G/DL (ref 32–34.5)
MCV RBC AUTO: 89.8 FL (ref 80–99.9)
MONOCYTES NFR BLD: 0.47 K/UL (ref 0.1–0.95)
MONOCYTES NFR BLD: 9 % (ref 2–12)
NEUTROPHILS NFR BLD: 70 % (ref 43–80)
NEUTS SEG NFR BLD: 3.73 K/UL (ref 1.8–7.3)
PLATELET # BLD AUTO: 252 K/UL (ref 130–450)
PMV BLD AUTO: 8.8 FL (ref 7–12)
POTASSIUM SERPL-SCNC: 4.6 MMOL/L (ref 3.5–5)
PROT SERPL-MCNC: 7.4 G/DL (ref 6.4–8.3)
RBC # BLD AUTO: 5.01 M/UL (ref 3.5–5.5)
SODIUM SERPL-SCNC: 139 MMOL/L (ref 132–146)
TROPONIN I SERPL HS-MCNC: <6 NG/L (ref 0–9)
TROPONIN I SERPL HS-MCNC: <6 NG/L (ref 0–9)
WBC OTHER # BLD: 5.3 K/UL (ref 4.5–11.5)

## 2024-08-07 PROCEDURE — 85379 FIBRIN DEGRADATION QUANT: CPT

## 2024-08-07 PROCEDURE — 96375 TX/PRO/DX INJ NEW DRUG ADDON: CPT

## 2024-08-07 PROCEDURE — 2580000003 HC RX 258: Performed by: STUDENT IN AN ORGANIZED HEALTH CARE EDUCATION/TRAINING PROGRAM

## 2024-08-07 PROCEDURE — 96374 THER/PROPH/DIAG INJ IV PUSH: CPT

## 2024-08-07 PROCEDURE — 85025 COMPLETE CBC W/AUTO DIFF WBC: CPT

## 2024-08-07 PROCEDURE — 6360000004 HC RX CONTRAST MEDICATION: Performed by: RADIOLOGY

## 2024-08-07 PROCEDURE — 99285 EMERGENCY DEPT VISIT HI MDM: CPT

## 2024-08-07 PROCEDURE — 93005 ELECTROCARDIOGRAM TRACING: CPT | Performed by: STUDENT IN AN ORGANIZED HEALTH CARE EDUCATION/TRAINING PROGRAM

## 2024-08-07 PROCEDURE — 6360000002 HC RX W HCPCS: Performed by: STUDENT IN AN ORGANIZED HEALTH CARE EDUCATION/TRAINING PROGRAM

## 2024-08-07 PROCEDURE — 71045 X-RAY EXAM CHEST 1 VIEW: CPT

## 2024-08-07 PROCEDURE — 80053 COMPREHEN METABOLIC PANEL: CPT

## 2024-08-07 PROCEDURE — 83880 ASSAY OF NATRIURETIC PEPTIDE: CPT

## 2024-08-07 PROCEDURE — 71275 CT ANGIOGRAPHY CHEST: CPT

## 2024-08-07 PROCEDURE — 6370000000 HC RX 637 (ALT 250 FOR IP): Performed by: STUDENT IN AN ORGANIZED HEALTH CARE EDUCATION/TRAINING PROGRAM

## 2024-08-07 PROCEDURE — 84484 ASSAY OF TROPONIN QUANT: CPT

## 2024-08-07 PROCEDURE — G0378 HOSPITAL OBSERVATION PER HR: HCPCS

## 2024-08-07 RX ORDER — ASPIRIN 81 MG/1
324 TABLET, CHEWABLE ORAL ONCE
Status: COMPLETED | OUTPATIENT
Start: 2024-08-07 | End: 2024-08-07

## 2024-08-07 RX ORDER — DIPHENHYDRAMINE HYDROCHLORIDE 50 MG/ML
50 INJECTION INTRAMUSCULAR; INTRAVENOUS ONCE
Status: COMPLETED | OUTPATIENT
Start: 2024-08-07 | End: 2024-08-07

## 2024-08-07 RX ADMIN — WATER 40 MG: 1 INJECTION INTRAMUSCULAR; INTRAVENOUS; SUBCUTANEOUS at 11:44

## 2024-08-07 RX ADMIN — IOPAMIDOL 75 ML: 755 INJECTION, SOLUTION INTRAVENOUS at 15:00

## 2024-08-07 RX ADMIN — DIPHENHYDRAMINE HYDROCHLORIDE 50 MG: 50 INJECTION INTRAMUSCULAR; INTRAVENOUS at 11:44

## 2024-08-07 RX ADMIN — ASPIRIN 81 MG 324 MG: 81 TABLET ORAL at 10:48

## 2024-08-07 ASSESSMENT — LIFESTYLE VARIABLES: HOW OFTEN DO YOU HAVE A DRINK CONTAINING ALCOHOL: NEVER

## 2024-08-07 NOTE — ED NOTES
Radiology Procedure Waiver   Name: Neli Maciel  : 1975  MRN: [id number]    Date:  24    Time: 2:24 PM EDT    Benefits of immediately proceeding with Radiology exam(s) without pre-testing outweigh the risks or are not indicated as specified below and therefore the following is/are being waived:    [] Pregnancy test   [] Patients LMP on-time and regular.   [] Patient had Tubal Ligation or has other Contraception Device.   [] Patient  is Menopausal or Premenarcheal.    [] Patient had Full or Partial Hysterectomy.    [x] Protocol for Iodine allergy    [] MRI Questionnaire     [] BUN/Creatinine   [] Patient age w/no hx of renal dysfunction.   [] Patient on Dialysis.   [] Recent Normal Labs.  Electronically signed by Carlos Le DO on 24 at 2:24 PM EDT

## 2024-08-07 NOTE — ED PROVIDER NOTES
Name: Neli Maciel    MRN: [id number]     Date / Time Roomed:  8/7/2024  5:08 PM  ED Bed Assignment:  PR4/PR4    ------------------ History of Present Illness --------------------  8/7/24, Time: 2:23 PM EDT   Chief Complaint   Patient presents with    Chest Pain     Midsternal Chest pain that radiates into neck and back. Pt states chest feels tight. Currently wearing heart monitor.       HPI     Neli Maciel is a 48 y.o. female, with hx of hypertension, asthma, fibromuscular dysplasia, PE, MTHFR gene mutation, who presents to the ED today for intermittent chest pains over the past several weeks which she states is of occurred approximately 3-4 times.  She states this morning she was getting ready and developed some midsternal chest heaviness and tightness that radiated to the back between the shoulder blades and up the right side of the neck and jaw.  Patient relates she has been a bit more winded recently when doing her activities.  She denies any anxiety or tingling feeling in her extremities.  She denies any sore throat or chest pains occurring after eating.  She denies any history of reflux.  Denies any leg swelling or pain.  She is on daily baby aspirin.  She states she did test positive for the MTHFR gene mutation at [facility] and states she has been on aspirin for this.  Patient is not currently on blood thinners.  She states she follows with Dr. Soriano, cardiology, and is currently wearing a Holter monitor for her chest pains.  She denies any history of arrhythmia however.  She states she does have a planned stress test outpatient however has not had this yet.  She does have a strong family history of early heart attack, she states her father had a quadruple bypass in his 40s. The pt denies other ROS at this time.     PCP: Yusef Torres MD.    -------------------- PMH --------------------    Past Medical History:   has a past medical history of Accelerated hypertension (7/23/2017), Asthma,  chest pains TECHNOLOGIST PROVIDED HISTORY: Reason for exam:->intermittent chest pains FINDINGS: The lungs are without acute focal process.  There is no effusion or pneumothorax. The cardiomediastinal silhouette is without acute process. The osseous structures are without acute process.     No acute process.       No results found.    ------------------- NURSING NOTES & VITALS -------------------    The nursing notes within the ED encounter and vital signs were reviewed.     Vitals:    08/07/24 0936   BP: 126/88   Pulse:    Resp: 19   Temp:    SpO2:         Patient Vitals for the past 8 hrs:   BP Resp Height Weight   08/07/24 0936 126/88 19 1.524 m (5') 76.2 kg (168 lb)         ------------- Final Impression, Disposition & Plan ---------------    Final Impression:   1. Chest pain, unspecified type        Disposition:  Admitted 08/07/2024 05:32:12 PM    PATIENT REFERRED TO:  No follow-up provider specified.    DISCHARGE MEDICATIONS:  New Prescriptions    No medications on file            Please note that portions of this note were completed with a voice recognition program.    Efforts were made to edit the dictations but occasionally words are mis-transcribed.    Carlos Le DO (electronically signed)

## 2024-08-08 ENCOUNTER — APPOINTMENT (OUTPATIENT)
Age: 49
End: 2024-08-08
Payer: COMMERCIAL

## 2024-08-08 ENCOUNTER — APPOINTMENT (OUTPATIENT)
Dept: NUCLEAR MEDICINE | Age: 49
End: 2024-08-08
Payer: COMMERCIAL

## 2024-08-08 LAB
ALBUMIN SERPL-MCNC: 4 G/DL (ref 3.5–5.2)
ALP SERPL-CCNC: 64 U/L (ref 35–104)
ALT SERPL-CCNC: 22 U/L (ref 0–32)
ANION GAP SERPL CALCULATED.3IONS-SCNC: 11 MMOL/L (ref 7–16)
AST SERPL-CCNC: 13 U/L (ref 0–31)
BASOPHILS # BLD: 0.02 K/UL (ref 0–0.2)
BASOPHILS NFR BLD: 0 % (ref 0–2)
BILIRUB SERPL-MCNC: 0.2 MG/DL (ref 0–1.2)
BUN SERPL-MCNC: 15 MG/DL (ref 6–20)
CALCIUM SERPL-MCNC: 9.2 MG/DL (ref 8.6–10.2)
CHLORIDE SERPL-SCNC: 103 MMOL/L (ref 98–107)
CHOLEST SERPL-MCNC: 235 MG/DL
CO2 SERPL-SCNC: 25 MMOL/L (ref 22–29)
CREAT SERPL-MCNC: 0.8 MG/DL (ref 0.5–1)
ECHO BSA: 1.84 M2
EKG ATRIAL RATE: 78 BPM
EKG ATRIAL RATE: 81 BPM
EKG P AXIS: 39 DEGREES
EKG P AXIS: 45 DEGREES
EKG P-R INTERVAL: 114 MS
EKG P-R INTERVAL: 122 MS
EKG Q-T INTERVAL: 368 MS
EKG Q-T INTERVAL: 380 MS
EKG QRS DURATION: 82 MS
EKG QRS DURATION: 86 MS
EKG QTC CALCULATION (BAZETT): 419 MS
EKG QTC CALCULATION (BAZETT): 441 MS
EKG R AXIS: 65 DEGREES
EKG R AXIS: 75 DEGREES
EKG T AXIS: 20 DEGREES
EKG T AXIS: 32 DEGREES
EKG VENTRICULAR RATE: 78 BPM
EKG VENTRICULAR RATE: 81 BPM
EOSINOPHIL # BLD: 0.02 K/UL (ref 0.05–0.5)
EOSINOPHILS RELATIVE PERCENT: 0 % (ref 0–6)
ERYTHROCYTE [DISTWIDTH] IN BLOOD BY AUTOMATED COUNT: 12.8 % (ref 11.5–15)
GFR, ESTIMATED: >90 ML/MIN/1.73M2
GLUCOSE SERPL-MCNC: 112 MG/DL (ref 74–99)
HBA1C MFR BLD: 5.3 % (ref 4–5.6)
HCT VFR BLD AUTO: 41.9 % (ref 34–48)
HDLC SERPL-MCNC: 57 MG/DL
HGB BLD-MCNC: 13.7 G/DL (ref 11.5–15.5)
IMM GRANULOCYTES # BLD AUTO: 0.13 K/UL (ref 0–0.58)
IMM GRANULOCYTES NFR BLD: 1 % (ref 0–5)
LDLC SERPL CALC-MCNC: 153 MG/DL
LYMPHOCYTES NFR BLD: 1.33 K/UL (ref 1.5–4)
LYMPHOCYTES RELATIVE PERCENT: 8 % (ref 20–42)
MAGNESIUM SERPL-MCNC: 2.1 MG/DL (ref 1.6–2.6)
MCH RBC QN AUTO: 29.1 PG (ref 26–35)
MCHC RBC AUTO-ENTMCNC: 32.7 G/DL (ref 32–34.5)
MCV RBC AUTO: 89.1 FL (ref 80–99.9)
MONOCYTES NFR BLD: 1.16 K/UL (ref 0.1–0.95)
MONOCYTES NFR BLD: 7 % (ref 2–12)
NEUTROPHILS NFR BLD: 84 % (ref 43–80)
NEUTS SEG NFR BLD: 13.63 K/UL (ref 1.8–7.3)
NUC STRESS EJECTION FRACTION: 81 %
PLATELET # BLD AUTO: 271 K/UL (ref 130–450)
PMV BLD AUTO: 8.9 FL (ref 7–12)
POTASSIUM SERPL-SCNC: 3.9 MMOL/L (ref 3.5–5)
PROT SERPL-MCNC: 6.9 G/DL (ref 6.4–8.3)
RBC # BLD AUTO: 4.7 M/UL (ref 3.5–5.5)
SODIUM SERPL-SCNC: 139 MMOL/L (ref 132–146)
STRESS BASELINE DIAS BP: 80 MMHG
STRESS BASELINE HR: 88 BPM
STRESS BASELINE ST DEPRESSION: 0 MM
STRESS BASELINE SYS BP: 116 MMHG
STRESS ESTIMATED WORKLOAD: 1 METS
STRESS PEAK DIAS BP: 70 MMHG
STRESS PEAK SYS BP: 150 MMHG
STRESS PERCENT HR ACHIEVED: 76 %
STRESS POST PEAK HR: 130 BPM
STRESS RATE PRESSURE PRODUCT: NORMAL BPM*MMHG
STRESS TARGET HR: 172 BPM
T4 FREE SERPL-MCNC: 1.1 NG/DL (ref 0.9–1.7)
TID: 1.09
TRIGL SERPL-MCNC: 125 MG/DL
TSH SERPL DL<=0.05 MIU/L-ACNC: 0.33 UIU/ML (ref 0.27–4.2)
VLDLC SERPL CALC-MCNC: 25 MG/DL
WBC OTHER # BLD: 16.3 K/UL (ref 4.5–11.5)

## 2024-08-08 PROCEDURE — 96372 THER/PROPH/DIAG INJ SC/IM: CPT

## 2024-08-08 PROCEDURE — 93306 TTE W/DOPPLER COMPLETE: CPT

## 2024-08-08 PROCEDURE — 3430000000 HC RX DIAGNOSTIC RADIOPHARMACEUTICAL: Performed by: RADIOLOGY

## 2024-08-08 PROCEDURE — A9500 TC99M SESTAMIBI: HCPCS | Performed by: RADIOLOGY

## 2024-08-08 PROCEDURE — 84443 ASSAY THYROID STIM HORMONE: CPT

## 2024-08-08 PROCEDURE — G0378 HOSPITAL OBSERVATION PER HR: HCPCS

## 2024-08-08 PROCEDURE — 6370000000 HC RX 637 (ALT 250 FOR IP): Performed by: INTERNAL MEDICINE

## 2024-08-08 PROCEDURE — 93010 ELECTROCARDIOGRAM REPORT: CPT | Performed by: INTERNAL MEDICINE

## 2024-08-08 PROCEDURE — 6360000002 HC RX W HCPCS: Performed by: CLINICAL NURSE SPECIALIST

## 2024-08-08 PROCEDURE — 85025 COMPLETE CBC W/AUTO DIFF WBC: CPT

## 2024-08-08 PROCEDURE — 83735 ASSAY OF MAGNESIUM: CPT

## 2024-08-08 PROCEDURE — 78452 HT MUSCLE IMAGE SPECT MULT: CPT

## 2024-08-08 PROCEDURE — 84439 ASSAY OF FREE THYROXINE: CPT

## 2024-08-08 PROCEDURE — 93005 ELECTROCARDIOGRAM TRACING: CPT | Performed by: CLINICAL NURSE SPECIALIST

## 2024-08-08 PROCEDURE — 78452 HT MUSCLE IMAGE SPECT MULT: CPT | Performed by: INTERNAL MEDICINE

## 2024-08-08 PROCEDURE — 93017 CV STRESS TEST TRACING ONLY: CPT

## 2024-08-08 PROCEDURE — 93018 CV STRESS TEST I&R ONLY: CPT | Performed by: INTERNAL MEDICINE

## 2024-08-08 PROCEDURE — 6360000002 HC RX W HCPCS: Performed by: INTERNAL MEDICINE

## 2024-08-08 PROCEDURE — APPSS60 APP SPLIT SHARED TIME 46-60 MINUTES: Performed by: CLINICAL NURSE SPECIALIST

## 2024-08-08 PROCEDURE — 36415 COLL VENOUS BLD VENIPUNCTURE: CPT

## 2024-08-08 PROCEDURE — 83036 HEMOGLOBIN GLYCOSYLATED A1C: CPT

## 2024-08-08 PROCEDURE — 6370000000 HC RX 637 (ALT 250 FOR IP): Performed by: CLINICAL NURSE SPECIALIST

## 2024-08-08 PROCEDURE — 80061 LIPID PANEL: CPT

## 2024-08-08 PROCEDURE — 80053 COMPREHEN METABOLIC PANEL: CPT

## 2024-08-08 PROCEDURE — 99254 IP/OBS CNSLTJ NEW/EST MOD 60: CPT | Performed by: INTERNAL MEDICINE

## 2024-08-08 PROCEDURE — 93016 CV STRESS TEST SUPVJ ONLY: CPT | Performed by: INTERNAL MEDICINE

## 2024-08-08 RX ORDER — HYDROCHLOROTHIAZIDE 12.5 MG/1
12.5 TABLET ORAL DAILY
Status: DISCONTINUED | OUTPATIENT
Start: 2024-08-09 | End: 2024-08-08

## 2024-08-08 RX ORDER — ACETAMINOPHEN 325 MG/1
650 TABLET ORAL EVERY 6 HOURS PRN
Status: DISCONTINUED | OUTPATIENT
Start: 2024-08-08 | End: 2024-08-09 | Stop reason: HOSPADM

## 2024-08-08 RX ORDER — ASPIRIN 81 MG/1
81 TABLET, CHEWABLE ORAL DAILY
COMMUNITY

## 2024-08-08 RX ORDER — HYDROCHLOROTHIAZIDE 12.5 MG/1
12.5 TABLET ORAL DAILY
Status: DISCONTINUED | OUTPATIENT
Start: 2024-08-08 | End: 2024-08-09 | Stop reason: HOSPADM

## 2024-08-08 RX ORDER — ENOXAPARIN SODIUM 100 MG/ML
40 INJECTION SUBCUTANEOUS DAILY
Status: DISCONTINUED | OUTPATIENT
Start: 2024-08-08 | End: 2024-08-09 | Stop reason: HOSPADM

## 2024-08-08 RX ORDER — VALSARTAN 160 MG/1
160 TABLET ORAL DAILY
Status: DISCONTINUED | OUTPATIENT
Start: 2024-08-08 | End: 2024-08-08

## 2024-08-08 RX ORDER — VALSARTAN AND HYDROCHLOROTHIAZIDE 160; 12.5 MG/1; MG/1
1 TABLET, FILM COATED ORAL DAILY
Status: DISCONTINUED | OUTPATIENT
Start: 2024-08-08 | End: 2024-08-08 | Stop reason: CLARIF

## 2024-08-08 RX ORDER — VALSARTAN 160 MG/1
160 TABLET ORAL DAILY
Status: DISCONTINUED | OUTPATIENT
Start: 2024-08-09 | End: 2024-08-08

## 2024-08-08 RX ORDER — REGADENOSON 0.08 MG/ML
0.4 INJECTION, SOLUTION INTRAVENOUS
Status: COMPLETED | OUTPATIENT
Start: 2024-08-08 | End: 2024-08-08

## 2024-08-08 RX ORDER — ATORVASTATIN CALCIUM 40 MG/1
40 TABLET, FILM COATED ORAL NIGHTLY
Status: DISCONTINUED | OUTPATIENT
Start: 2024-08-08 | End: 2024-08-09 | Stop reason: HOSPADM

## 2024-08-08 RX ORDER — VALSARTAN 160 MG/1
160 TABLET ORAL DAILY
Status: DISCONTINUED | OUTPATIENT
Start: 2024-08-08 | End: 2024-08-09 | Stop reason: HOSPADM

## 2024-08-08 RX ORDER — TETRAKIS(2-METHOXYISOBUTYLISOCYANIDE)COPPER(I) TETRAFLUOROBORATE 1 MG/ML
13.5 INJECTION, POWDER, LYOPHILIZED, FOR SOLUTION INTRAVENOUS
Status: COMPLETED | OUTPATIENT
Start: 2024-08-08 | End: 2024-08-08

## 2024-08-08 RX ORDER — TETRAKIS(2-METHOXYISOBUTYLISOCYANIDE)COPPER(I) TETRAFLUOROBORATE 1 MG/ML
35 INJECTION, POWDER, LYOPHILIZED, FOR SOLUTION INTRAVENOUS
Status: COMPLETED | OUTPATIENT
Start: 2024-08-08 | End: 2024-08-08

## 2024-08-08 RX ADMIN — Medication 32 MILLICURIE: at 13:36

## 2024-08-08 RX ADMIN — VALSARTAN 160 MG: 160 TABLET, FILM COATED ORAL at 15:43

## 2024-08-08 RX ADMIN — Medication 13.5 MILLICURIE: at 12:09

## 2024-08-08 RX ADMIN — ENOXAPARIN SODIUM 40 MG: 100 INJECTION SUBCUTANEOUS at 08:53

## 2024-08-08 RX ADMIN — HYDROCHLOROTHIAZIDE 12.5 MG: 12.5 TABLET ORAL at 15:43

## 2024-08-08 RX ADMIN — ACETAMINOPHEN 650 MG: 325 TABLET ORAL at 00:33

## 2024-08-08 RX ADMIN — REGADENOSON 0.4 MG: 0.08 INJECTION, SOLUTION INTRAVENOUS at 13:38

## 2024-08-08 RX ADMIN — ATORVASTATIN CALCIUM 40 MG: 40 TABLET, FILM COATED ORAL at 20:34

## 2024-08-08 ASSESSMENT — PAIN DESCRIPTION - PAIN TYPE: TYPE: ACUTE PAIN

## 2024-08-08 ASSESSMENT — PAIN DESCRIPTION - LOCATION: LOCATION: HEAD

## 2024-08-08 ASSESSMENT — PAIN SCALES - GENERAL: PAINLEVEL_OUTOF10: 3

## 2024-08-08 ASSESSMENT — PAIN DESCRIPTION - DESCRIPTORS: DESCRIPTORS: ACHING;DULL;DISCOMFORT

## 2024-08-08 ASSESSMENT — PAIN DESCRIPTION - ONSET: ONSET: GRADUAL

## 2024-08-08 ASSESSMENT — PAIN DESCRIPTION - ORIENTATION: ORIENTATION: MID

## 2024-08-08 ASSESSMENT — PAIN DESCRIPTION - FREQUENCY: FREQUENCY: INTERMITTENT

## 2024-08-08 NOTE — CARE COORDINATION
8/8/24, Patient admitted for chest pain.  SW met with patient in room to discuss transition of care/discharge preference and SW role.  Patient lives with  and 3 children (18, 17 & 8) in a 1 story home with 2 steps to enter the home.  DME owned is none.  Patient is independent with ADL's and still drives and works.  Pharmacy is [facility] in [place] and PCP is Dr. Torres.  Cardiology on for patient-stress test needed.  No needs identified at this time.   to be transportation.  SW to follow.    Case Management Assessment  Initial Evaluation    Date/Time of Evaluation: 8/8/2024 9:38 AM  Assessment Completed by: EDGAR Toth    If patient is discharged prior to next notation, then this note serves as note for discharge by case management.    Patient Name: Neli Maciel                   YOB: 1975  Diagnosis: Chest pain [R07.9]  Chest pain, unspecified type [R07.9]                   Date / Time: 8/7/2024  5:08 PM    Patient Admission Status: Observation   Readmission Risk (Low < 19, Mod (19-27), High > 27): No data recorded  Current PCP: Yusef Torres MD  PCP verified by CM? Yes    Chart Reviewed: Yes      History Provided by: Patient  Patient Orientation: Alert and Oriented    Patient Cognition: Alert    Hospitalization in the last 30 days (Readmission):  No    If yes, Readmission Assessment in  Navigator will be completed.    Advance Directives:      Code Status: Prior   Patient's Primary Decision Maker is: Legal Next of Kin      Discharge Planning:    Patient lives with: Spouse/Significant Other, Children Type of Home: House  Primary Care Giver: Self  Patient Support Systems include: Spouse/Significant Other   Current Financial resources:    Current community resources:    Current services prior to admission: None            Current DME:              Type of Home Care services:  None    ADLS  Prior functional level: Independent in ADLs/IADLs  Current functional level:  Independent in ADLs/IADLs    PT AM-PAC:   /24  OT AM-PAC:   /24    Family can provide assistance at DC: Yes  Would you like Case Management to discuss the discharge plan with any other family members/significant others, and if so, who? No  Plans to Return to Present Housing: Yes  Other Identified Issues/Barriers to RETURNING to current housing: N/A  Potential Assistance needed at discharge: N/A            Potential DME:    Patient expects to discharge to: House  Plan for transportation at discharge:      Financial    Payor: [employer] / Plan: [employer] [address] / Product Type: *No Product type* /     Does insurance require precert for SNF: Yes    Potential assistance Purchasing Medications: No  Meds-to-Beds request:        [id number] - [place], [place] - [address] [place] [address] [phone] - F [phone]  [address] [place] [phone]  Phone: [phone] Fax: [phone]    [id number] - [place], [place] - [address] [place], [place] [address] - P [phone] -  [phone]  [address] [place] [id number]  Phone: [phone] Fax: [phone]      Notes:    Factors facilitating achievement of predicted outcomes: Family support    Barriers to discharge: N/A    Additional Case Management Notes: See Above    The Plan for Transition of Care is related to the following treatment goals of Chest pain [R07.9]  Chest pain, unspecified type [R07.9]    IF APPLICABLE: The Patient and/or patient representative Neli and her family were provided with a choice of provider and agrees with the discharge plan. Freedom of choice list with basic dialogue that supports the patient's individualized plan of care/goals and shares the quality data associated with the providers was provided to:     Patient Representative Name:       The Patient and/or Patient Representative Agree with the Discharge Plan?      EDGAR Toth  Case Management Department  Ph: [phone] Fax: N/A

## 2024-08-08 NOTE — PROGRESS NOTES
4 Eyes Skin Assessment     NAME:  Neli Maciel  YOB: 1975  MEDICAL RECORD NUMBER:  [id number]    The patient is being assessed for  Admission    I agree that at least one RN has performed a thorough Head to Toe Skin Assessment on the patient. ALL assessment sites listed below have been assessed.      Areas assessed by both nurses:    Head, Face, Ears, Shoulders, Back, Chest, Arms, Elbows, Hands, Sacrum. Buttock, Coccyx, Ischium, Legs. Feet and Heels, and Under Medical Devices         Does the Patient have a Wound? No noted wound(s)       Bryon Prevention initiated by RN: No  Wound Care Orders initiated by RN: No    Pressure Injury (Stage 3,4, Unstageable, DTI, NWPT, and Complex wounds) if present, place Wound referral order by RN under : No    New Ostomies, if present place, Ostomy referral order under : No     Nurse 1 eSignature: Electronically signed by Robert Merino RN on 8/8/24 at 12:53 AM EDT    **SHARE this note so that the co-signing nurse can place an eSignature**    Nurse 2 eSignature: Electronically signed by Miri De La Paz RN on 8/8/24 at 12:55 AM EDT

## 2024-08-08 NOTE — CONSULTS
Inpatient Cardiology Consultation      Reason for Consult:  intermittent CP    Consulting Physician: Dr. Hicsk     Requesting Physician:  Dr. Torres     Date of Consultation: 8/8/2024    HISTORY OF PRESENT ILLNESS:   Neli Maciel  is a 48 y.o.  female-- known to Dr Soriano - seen in office 8/1/2024 with complaints of chest discomfort, palpitations, fatigue, dyspnea on exertion, dizziness /80 HR 85 weight 171 pounds..  Stress, echo, and Zio patch were ordered and pending.    PMH: see below    8/7/2024 [facility] ED 09 24.  Walk-in for chest tightness.  Wearing Zio patch  Arrival vitals: //78 HR 89 afebrile SpO2 98% on room air  Significant Labs: Troponin less than 6 and less than 6.  proBNP 63.  D-dimer 741 BUN 15 CR 0.8 K3.9 cholesterol total 235 HDL 57  triglycerides 125 VLDL 25 albumin 4.0 LFT WNL WBC 5.3 Hgb 14.5 .  Fasting glucose this morning 112   RAD: CT of the pulmonary with contrast: No PE or acute pulmonary abnormality.  1 view chest x-ray: No acute process  ED treatment: Aspirin 324.  Solu-Medrol    Patient seen and examined.  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  Walks and rides bike 3 X week  occasional fatigue - no VAZQUEZ or CP with activity.     6 months ago started to have extreme fatigue, frequent nausea, abdominal bloating, early satiety.  Shortness of breath at rest and dyspnea on exertion with minimal activity of just talking.  Intermittent palpitations and dizziness.    1 month ago, started to have chest discomfort more at rest but also with activity.  A tightness in the center of her chest that would radiate to her shoulder blades severe 10 out of 10 lasting for hours with associated symptoms of diaphoresis and fatigue.  When she would get this discomfort she would try ginger ale, Tums, Lexi-Tenafly with no relief.  She did not come to the  above  Patient counseled on weight loss, exercise, stress reduction, blood pressure control, cholesterol management.  Patient requested referral to weight loss clinic.    Above assessment and plan are made in collaboration with Dr. Fabiola LOPEZ-[facility]     Electronically signed by AILEEN Martini on 8/8/2024 at 8:36 AM     Patient chart reviewed with AILEEN Martini.  Patient was seen recently by Dr. Soriano and had ZIO XT monitor placed and was referred for outpatient echocardiogram and Lexiscan.  Her current hospitalization, past medical history, medications, EKG, laboratory data, and imaging were all reviewed.  CTA of the chest was also reviewed and revealed no coronary calcifications and normal proximal to mid coronary arteries.  She had a sleep study in fall of last year and was reported as no obstructive sleep apnea but shallow breathing.  Patient seen and examined independently in the stress lab.  Physical examination was remarkable for obesity.  I agree with the above assessment and plan made in collaboration with AILEEN Martini.    Thank you for allowing me to share in the care of this patient.  Patti Hicks MD

## 2024-08-08 NOTE — PLAN OF CARE
Problem: Discharge Planning  Goal: Discharge to home or other facility with appropriate resources  8/8/2024 0024 by Robert Merino RN  Outcome: Progressing  8/8/2024 0024 by Robert Merino RN  Outcome: Progressing     Problem: Chronic Conditions and Co-morbidities  Goal: Patient's chronic conditions and co-morbidity symptoms are monitored and maintained or improved  8/8/2024 0024 by Robert Merino RN  Outcome: Progressing  8/8/2024 0024 by Robert Merino RN  Outcome: Progressing     Problem: Discharge Planning  Goal: Discharge to home or other facility with appropriate resources  8/8/2024 0024 by Robert Merino RN  Outcome: Progressing  8/8/2024 0024 by Robert Merino RN  Outcome: Progressing     Problem: Chronic Conditions and Co-morbidities  Goal: Patient's chronic conditions and co-morbidity symptoms are monitored and maintained or improved  8/8/2024 0024 by Robert Merino RN  Outcome: Progressing  8/8/2024 0024 by Robert Merino RN  Outcome: Progressing

## 2024-08-09 VITALS
TEMPERATURE: 97.8 F | BODY MASS INDEX: 34.55 KG/M2 | OXYGEN SATURATION: 98 % | WEIGHT: 176 LBS | HEART RATE: 82 BPM | SYSTOLIC BLOOD PRESSURE: 136 MMHG | DIASTOLIC BLOOD PRESSURE: 78 MMHG | RESPIRATION RATE: 16 BRPM | HEIGHT: 60 IN

## 2024-08-09 LAB
ECHO AO ASC DIAM: 2.7 CM
ECHO AO ASCENDING AORTA INDEX: 1.53 CM/M2
ECHO AV AREA PEAK VELOCITY: 2.2 CM2
ECHO AV AREA VTI: 2.1 CM2
ECHO AV AREA/BSA PEAK VELOCITY: 1.2 CM2/M2
ECHO AV AREA/BSA VTI: 1.2 CM2/M2
ECHO AV CUSP MM: 1.7 CM
ECHO AV MEAN GRADIENT: 6 MMHG
ECHO AV MEAN VELOCITY: 1.1 M/S
ECHO AV PEAK GRADIENT: 10 MMHG
ECHO AV PEAK VELOCITY: 1.6 M/S
ECHO AV VELOCITY RATIO: 0.69
ECHO AV VTI: 34.5 CM
ECHO BSA: 1.84 M2
ECHO LA DIAMETER INDEX: 1.75 CM/M2
ECHO LA DIAMETER: 3.1 CM
ECHO LA VOL A-L A2C: 31 ML (ref 22–52)
ECHO LA VOL A-L A4C: 36 ML (ref 22–52)
ECHO LA VOL MOD A2C: 30 ML (ref 22–52)
ECHO LA VOL MOD A4C: 35 ML (ref 22–52)
ECHO LA VOLUME AREA LENGTH: 35 ML
ECHO LA VOLUME INDEX A-L A2C: 18 ML/M2 (ref 16–34)
ECHO LA VOLUME INDEX A-L A4C: 20 ML/M2 (ref 16–34)
ECHO LA VOLUME INDEX AREA LENGTH: 20 ML/M2 (ref 16–34)
ECHO LA VOLUME INDEX MOD A2C: 17 ML/M2 (ref 16–34)
ECHO LA VOLUME INDEX MOD A4C: 20 ML/M2 (ref 16–34)
ECHO LV FRACTIONAL SHORTENING: 42 % (ref 28–44)
ECHO LV INTERNAL DIMENSION DIASTOLE INDEX: 2.54 CM/M2
ECHO LV INTERNAL DIMENSION DIASTOLIC: 4.5 CM (ref 3.9–5.3)
ECHO LV INTERNAL DIMENSION SYSTOLIC INDEX: 1.47 CM/M2
ECHO LV INTERNAL DIMENSION SYSTOLIC: 2.6 CM
ECHO LV IVSD: 0.7 CM (ref 0.6–0.9)
ECHO LV IVSS: 1.5 CM
ECHO LV MASS 2D: 123.1 G (ref 67–162)
ECHO LV MASS INDEX 2D: 69.5 G/M2 (ref 43–95)
ECHO LV POSTERIOR WALL DIASTOLIC: 1 CM (ref 0.6–0.9)
ECHO LV POSTERIOR WALL SYSTOLIC: 1.3 CM
ECHO LV RELATIVE WALL THICKNESS RATIO: 0.44
ECHO LVOT AREA: 3.1 CM2
ECHO LVOT AV VTI INDEX: 0.68
ECHO LVOT DIAM: 2 CM
ECHO LVOT MEAN GRADIENT: 3 MMHG
ECHO LVOT PEAK GRADIENT: 5 MMHG
ECHO LVOT PEAK VELOCITY: 1.1 M/S
ECHO LVOT STROKE VOLUME INDEX: 41.5 ML/M2
ECHO LVOT SV: 73.5 ML
ECHO LVOT VTI: 23.4 CM
ECHO MV A VELOCITY: 0.61 M/S
ECHO MV AREA VTI: 3.8 CM2
ECHO MV E DECELERATION TIME (DT): 165.4 MS
ECHO MV E VELOCITY: 1 M/S
ECHO MV E/A RATIO: 1.64
ECHO MV LVOT VTI INDEX: 0.83
ECHO MV MAX VELOCITY: 1 M/S
ECHO MV MEAN GRADIENT: 2 MMHG
ECHO MV MEAN VELOCITY: 0.7 M/S
ECHO MV PEAK GRADIENT: 4 MMHG
ECHO MV VTI: 19.5 CM
ECHO PULMONARY ARTERY END DIASTOLIC PRESSURE: 2 MMHG
ECHO PV REGURGITANT MAX VELOCITY: 0.8 M/S
ECHO PVEIN PEAK D VELOCITY: 0.3 M/S
ECHO PVEIN PEAK S VELOCITY: 0.6 M/S
ECHO PVEIN S/D RATIO: 2
ECHO RV INTERNAL DIMENSION: 2.1 CM
ECHO RV TAPSE: 2.3 CM (ref 1.7–?)

## 2024-08-09 PROCEDURE — 6370000000 HC RX 637 (ALT 250 FOR IP): Performed by: CLINICAL NURSE SPECIALIST

## 2024-08-09 PROCEDURE — 93306 TTE W/DOPPLER COMPLETE: CPT | Performed by: INTERNAL MEDICINE

## 2024-08-09 PROCEDURE — G0378 HOSPITAL OBSERVATION PER HR: HCPCS

## 2024-08-09 RX ORDER — ATORVASTATIN CALCIUM 40 MG/1
40 TABLET, FILM COATED ORAL NIGHTLY
Qty: 30 TABLET | Refills: 3 | Status: SHIPPED | OUTPATIENT
Start: 2024-08-09 | End: 2024-08-14 | Stop reason: ALTCHOICE

## 2024-08-09 RX ADMIN — VALSARTAN 160 MG: 160 TABLET, FILM COATED ORAL at 08:07

## 2024-08-09 RX ADMIN — HYDROCHLOROTHIAZIDE 12.5 MG: 12.5 TABLET ORAL at 08:07

## 2024-08-09 NOTE — PLAN OF CARE
[facility] cardiology  Plan of Care:       Echo and stress test reviewed by Dr. Hicks.  Iker for discharge from cardiology standpoint.  Follow-up with Dr. Soriano.  Patient was notified of echo and stress results.    Please call if you have any questions or concerns  Electronically signed by AILEEN Perez CNP on 8/9/24 at 11:14 AM EDT

## 2024-08-09 NOTE — H&P
[facility]              [address] [place] [address]                           HISTORY & PHYSICAL      PATIENT NAME: LEONID RIDDLE              : 1975  MED REC NO: [id number]                        ROOM: 8201  ACCOUNT NO: [id number]                       ADMIT DATE: 2024  PROVIDER: Yusef Torres MD      ID:  The patient is a 48-year-old female.    CHIEF COMPLAINT:  Chest pain.    HISTORY OF PRESENT ILLNESS:  The patient is a 48-year-old female with history of hypertension, hyperlipidemia, and strong family history of coronary artery disease, who has had intermittent and recurrent episodes of chest heaviness, becoming worse and prompting presentation to the emergency room.  She just recently was seen by outpatient Cardiology for the same problem and the plan was for some noninvasive testing as outpatient.  However, she had worsening of her symptoms, which prompted presentation to the emergency room.    MEDICATIONS:  See chart.    PAST MEDICAL HISTORY:  Hypertension, hyperlipidemia.    SOCIAL HISTORY:  No alcohol, no tobacco.    FAMILY HISTORY:  Positive for premature coronary artery disease.    REVIEW OF SYSTEMS:  NEUROMUSCULAR:  Denies headache or dizziness.  RESPIRATORY:  No recent shortness of breath, cough, fevers, or night sweats.  CARDIOVASCULAR:  Atypical chest pain as described above.  GASTROINTESTINAL:  No recent nausea, vomiting, diarrhea, abdominal pain, melena, or hematochezia.  GENITOURINARY:  Denies dysuria, hematuria, or oliguria.  MUSCULOSKELETAL:  Denies.  HEMATOLOGIC:  Denies.  PSYCHIATRIC:  Denies.    PHYSICAL EXAMINATION:  GENERAL:  The patient is a pleasant 48-year-old female, alert and oriented, in no acute distress.  HEENT:  Head normal size and shape.  Pupils equal and reactive.  Sclerae were clear.  Fundi were not visualized.  Extraocular movements were intact.  Oral mucous membranes were clear and adequately

## 2024-08-09 NOTE — PLAN OF CARE
Problem: Discharge Planning  Goal: Discharge to home or other facility with appropriate resources  8/9/2024 0016 by Maine Tran, RN  Outcome: Progressing  8/8/2024 1037 by Sparkle Swann, RN  Outcome: Progressing

## 2024-08-09 NOTE — PROGRESS NOTES
Feels better   No further chest pain  Afebrile , vs stable  RRR  Lungs clear  Discharge today  She will follow up with cardiology as out patient

## 2024-08-09 NOTE — PROGRESS NOTES
Discharge instructions reviewed with patient at the bedside. All questions answered at at this time.

## 2024-08-09 NOTE — PLAN OF CARE
Problem: Discharge Planning  Goal: Discharge to home or other facility with appropriate resources  8/9/2024 1251 by Ryanne Camarena RN  Outcome: Adequate for Discharge  8/9/2024 0016 by Maine Tran RN  Outcome: Progressing     Problem: Chronic Conditions and Co-morbidities  Goal: Patient's chronic conditions and co-morbidity symptoms are monitored and maintained or improved  8/9/2024 1251 by Ryanne Camarena RN  Outcome: Adequate for Discharge  8/9/2024 0016 by Maine Tran RN  Outcome: Progressing     Problem: Pain  Goal: Verbalizes/displays adequate comfort level or baseline comfort level  8/9/2024 1251 by Ryanne Camarena RN  Outcome: Adequate for Discharge  8/9/2024 0016 by Maine Tran RN  Outcome: Progressing

## 2024-08-12 ENCOUNTER — TELEPHONE (OUTPATIENT)
Dept: CARDIOLOGY | Age: 49
End: 2024-08-12

## 2024-08-12 NOTE — TELEPHONE ENCOUNTER
Called patient to go over stress test instructions for August 14, 2024. Patient states she had stress test last week in the hospital. Charity notified and said to cancel stress test for the 14th, patient notified.

## 2024-08-12 NOTE — DISCHARGE SUMMARY
[facility]              [address] [place] [address]                            DISCHARGE SUMMARY      PATIENT NAME: LEONID RIDDLE              : 1975  MED REC NO: [id number]                        ROOM: 8201  ACCOUNT NO: [id number]                       ADMIT DATE: 2024  PROVIDER: Richard Lopez MD      REFERRING PHYSICIAN:  BRIAN LOOMIS    HISTORY AND HOSPITAL COURSE:  The patient is a 48-year-old female with history of hypertension, hyperlipidemia, and strong family history of early coronary artery disease, who has had intermittent and recurrent episodes of chest heaviness becoming worse and prompting presentation to the emergency room.  She had just recently been seen and evaluated by Outpatient Cardiology for this and the plan at that point was for scheduled noninvasive testing.  However, her symptomatology did progress, which frightened her and prompting presentation to the emergency room now.  The patient was admitted to the hospital observation area.  She was seen by Cardiology.  She actually underwent a nuclear Lexiscan stress test with myocardial perfusion.  Her stress ECG was positive for ischemia, but the stress combined conclusion was that the study was negative for myocardial ischemia.  She had a Zio XT patch that was placed prior to this admission.  The plan will be for her to follow up with her outpatient scheduled cardiac visit.  She was discharged to home on 2024.    CONDITION ON DISCHARGE:  Improved.    DISPOSITION:  Home.    FINAL DIAGNOSES:    1. Chest pain.  2. Hypertension.  3. Hyperlipidemia.    MEDICATIONS ON DISCHARGE:  Include aspirin 81 mg daily and atorvastatin 40 mg daily.    FOLLOWUP:  She is to see me in 1 week.    DIAGNOSES:  As listed already.    MEDICATIONS:  As listed.          RICHARD LOPEZ MD      D:  2024 16:38:13     T:  2024 03:13:32     NISHI/RAMÓN  Job #:  [id number]     Doc#:

## 2024-08-13 ENCOUNTER — OFFICE VISIT (OUTPATIENT)
Dept: CARDIOLOGY CLINIC | Age: 49
End: 2024-08-13

## 2024-08-13 VITALS
RESPIRATION RATE: 16 BRPM | WEIGHT: 174 LBS | BODY MASS INDEX: 34.16 KG/M2 | HEART RATE: 74 BPM | HEIGHT: 60 IN | DIASTOLIC BLOOD PRESSURE: 80 MMHG | SYSTOLIC BLOOD PRESSURE: 120 MMHG

## 2024-08-13 DIAGNOSIS — R10.2 PELVIC PAIN AFFECTING PREGNANCY IN FIRST TRIMESTER, ANTEPARTUM: ICD-10-CM

## 2024-08-13 DIAGNOSIS — R06.02 SHORTNESS OF BREATH: ICD-10-CM

## 2024-08-13 DIAGNOSIS — R42 DIZZINESS: ICD-10-CM

## 2024-08-13 DIAGNOSIS — N80.109 ENDOMETRIOSIS OF OVARY: ICD-10-CM

## 2024-08-13 DIAGNOSIS — Z88.9 HISTORY OF SEASONAL ALLERGIES: ICD-10-CM

## 2024-08-13 DIAGNOSIS — G43.109 MIGRAINE WITH AURA AND WITHOUT STATUS MIGRAINOSUS, NOT INTRACTABLE: ICD-10-CM

## 2024-08-13 DIAGNOSIS — I10 ESSENTIAL HYPERTENSION: Primary | ICD-10-CM

## 2024-08-13 DIAGNOSIS — O02.1 MISSED ABORTION: ICD-10-CM

## 2024-08-13 DIAGNOSIS — Z82.49 FAMILY HISTORY OF EARLY CAD: ICD-10-CM

## 2024-08-13 DIAGNOSIS — I20.89 OTHER FORMS OF ANGINA PECTORIS (HCC): ICD-10-CM

## 2024-08-13 DIAGNOSIS — K59.1 FUNCTIONAL DIARRHEA: ICD-10-CM

## 2024-08-13 DIAGNOSIS — R53.83 OTHER FATIGUE: ICD-10-CM

## 2024-08-13 DIAGNOSIS — O26.891 PELVIC PAIN AFFECTING PREGNANCY IN FIRST TRIMESTER, ANTEPARTUM: ICD-10-CM

## 2024-08-13 DIAGNOSIS — N80.30 ENDOMETRIOSIS OF PELVIC PERITONEUM: ICD-10-CM

## 2024-08-13 DIAGNOSIS — G89.18 POST-OP PAIN: ICD-10-CM

## 2024-08-13 DIAGNOSIS — R00.2 PALPITATIONS: ICD-10-CM

## 2024-08-13 NOTE — PROGRESS NOTES
study is negative for myocardial ischemia and infarction. Findings suggest a low risk of cardiac events.   CT Findings Nondiagnostic CT images demonstrate no evidence of coronary arterial calcification.               Cardiac catheterization reviewed:     CXR reviewed:     The 10-year ASCVD risk score (Wilfrid BROWN, et al., 2019) is: 1.5%    Values used to calculate the score:      Age: 48 years      Sex: Female      Is Non- : No      Diabetic: No      Tobacco smoker: No      Systolic Blood Pressure: 120 mmHg      Is BP treated: Yes      HDL Cholesterol: 57 mg/dL      Total Cholesterol: 235 mg/dL    ASSESSMENT / PLAN:    1. Essential hypertension  - EKG 12 lead  - PET myocardial perfusion imaging; Future    2. Other forms of angina pectoris (HCC)  - PET myocardial perfusion imaging; Future    3. Functional diarrhea  - PET myocardial perfusion imaging; Future    4. Endometriosis of ovary  - PET myocardial perfusion imaging; Future    5. History of seasonal allergies  - PET myocardial perfusion imaging; Future    6. Migraine with aura and without status migrainosus, not intractable  - PET myocardial perfusion imaging; Future    7. Missed   - PET myocardial perfusion imaging; Future    8. Pelvic pain affecting pregnancy in first trimester, antepartum  - PET myocardial perfusion imaging; Future    9. Post-op pain  - PET myocardial perfusion imaging; Future    10. Palpitations  - PET myocardial perfusion imaging; Future    11. Other fatigue  - PET myocardial perfusion imaging; Future    12. Shortness of breath  - PET myocardial perfusion imaging; Future    13. Dizziness  - PET myocardial perfusion imaging; Future    14. Family history of early CAD  - PET myocardial perfusion imaging; Future    15. Endometriosis of pelvic peritoneum  - PET myocardial perfusion imaging; Future        Plan:  Awaiting Zio patch heart monitor for further evaluation  Despite recent stress test and echocardiogram

## 2024-08-14 RX ORDER — PRAVASTATIN SODIUM 10 MG
10 TABLET ORAL DAILY
Qty: 90 TABLET | Refills: 2 | Status: SHIPPED | OUTPATIENT
Start: 2024-08-14

## 2024-08-19 ENCOUNTER — TELEPHONE (OUTPATIENT)
Dept: CARDIOLOGY CLINIC | Age: 49
End: 2024-08-19

## 2024-08-19 NOTE — TELEPHONE ENCOUNTER
----- Message from Dr. Soy Soriano MD sent at 8/14/2024  5:40 PM EDT -----  Regarding: Cardiac PET stress test at the [facility]  Please notify patient because she has IV dye allergies, I am switching from coronary CT angiogram to cardiac PET stress test at the [facility].  Please arrange for cardiac PET stress test at the [facility]

## 2024-08-23 DIAGNOSIS — R00.2 PALPITATIONS: ICD-10-CM

## 2024-08-23 DIAGNOSIS — R07.9 CHEST PAIN, UNSPECIFIED TYPE: ICD-10-CM

## 2024-08-23 DIAGNOSIS — Z82.49 FAMILY HISTORY OF EARLY CAD: ICD-10-CM

## 2024-08-23 DIAGNOSIS — R06.02 SHORTNESS OF BREATH: ICD-10-CM

## 2024-08-26 ENCOUNTER — TELEPHONE (OUTPATIENT)
Dept: CARDIOLOGY CLINIC | Age: 49
End: 2024-08-26

## 2024-08-26 DIAGNOSIS — I47.10 SVT (SUPRAVENTRICULAR TACHYCARDIA) (HCC): Primary | ICD-10-CM

## 2024-08-26 NOTE — TELEPHONE ENCOUNTER
Patient notified of monitor results per Dr. Soriano. Referral placed to EP per Dr. Soriano's recommendations.

## 2024-08-26 NOTE — TELEPHONE ENCOUNTER
----- Message from Dr. Soy Soriano MD sent at 8/26/2024  1:25 PM EDT -----  Please notify patient heart monitor shows 2 episodes of ventricular tachycardia and 5 episodes of supraventricular tachycardia for which she was symptomatic.  I would like patient to see EP.  Please refer patient to EP.

## 2024-08-28 ENCOUNTER — OFFICE VISIT (OUTPATIENT)
Dept: NON INVASIVE DIAGNOSTICS | Age: 49
End: 2024-08-28
Payer: COMMERCIAL

## 2024-08-28 VITALS
BODY MASS INDEX: 32.66 KG/M2 | DIASTOLIC BLOOD PRESSURE: 76 MMHG | WEIGHT: 173 LBS | HEIGHT: 61 IN | HEART RATE: 89 BPM | OXYGEN SATURATION: 98 % | RESPIRATION RATE: 16 BRPM | SYSTOLIC BLOOD PRESSURE: 110 MMHG

## 2024-08-28 DIAGNOSIS — I47.11 INAPPROPRIATE SINUS NODE TACHYCARDIA (HCC): ICD-10-CM

## 2024-08-28 DIAGNOSIS — I10 ESSENTIAL HYPERTENSION: Primary | ICD-10-CM

## 2024-08-28 PROCEDURE — G8427 DOCREV CUR MEDS BY ELIG CLIN: HCPCS | Performed by: STUDENT IN AN ORGANIZED HEALTH CARE EDUCATION/TRAINING PROGRAM

## 2024-08-28 PROCEDURE — 3078F DIAST BP <80 MM HG: CPT | Performed by: STUDENT IN AN ORGANIZED HEALTH CARE EDUCATION/TRAINING PROGRAM

## 2024-08-28 PROCEDURE — 1036F TOBACCO NON-USER: CPT | Performed by: STUDENT IN AN ORGANIZED HEALTH CARE EDUCATION/TRAINING PROGRAM

## 2024-08-28 PROCEDURE — 93000 ELECTROCARDIOGRAM COMPLETE: CPT | Performed by: STUDENT IN AN ORGANIZED HEALTH CARE EDUCATION/TRAINING PROGRAM

## 2024-08-28 PROCEDURE — G8417 CALC BMI ABV UP PARAM F/U: HCPCS | Performed by: STUDENT IN AN ORGANIZED HEALTH CARE EDUCATION/TRAINING PROGRAM

## 2024-08-28 PROCEDURE — 99204 OFFICE O/P NEW MOD 45 MIN: CPT | Performed by: STUDENT IN AN ORGANIZED HEALTH CARE EDUCATION/TRAINING PROGRAM

## 2024-08-28 PROCEDURE — 3074F SYST BP LT 130 MM HG: CPT | Performed by: STUDENT IN AN ORGANIZED HEALTH CARE EDUCATION/TRAINING PROGRAM

## 2024-08-28 RX ORDER — METOPROLOL SUCCINATE 25 MG/1
25 TABLET, EXTENDED RELEASE ORAL DAILY
Qty: 30 TABLET | Refills: 5 | Status: SHIPPED | OUTPATIENT
Start: 2024-08-28 | End: 2024-09-27

## 2024-08-28 NOTE — PROGRESS NOTES
[facility]- The Heart and Vascular [place] [employer] Electrophysiology  Outpatient Consultation Report  PATIENT: Neli Maciel  MEDICAL RECORD NUMBER: [id number]  DATE OF SERVICE:  2024  ATTENDING ELECTROPHYSIOLOGIST: Davy Duvall DO  REFERRING PHYSICIAN: Yusef Torres MD  CHIEF COMPLAINT: Palpitations    HPI: Neli Maciel is a 48 y.o. female with a history of palpitations, PE, HTN, obesity, MTHFR deficiency, carpal tunnel sp bilateral release, asthma, GERD, endometriosis, ADHD and IV dye allergy.  She is managed by Dr. Soriano with aspirin 81 mg daily, HCTZ 12.5 mg daily, pravastatin 10 mg daily, and valsartan and 160 mg daily.  In , she reportedly developed palpitation symptoms, which would occur around the time of menses. In 2024, she complained of chest pain while watching TV, discomfort similar to GERD, radiation to back/neck, 10/10, duration of ~3.5 hours, wax and wane. A few weeks later, she had a similar episode and was referred to Cardiology. She reported increase in palpitation symptoms frequency (daily), duration of a few seconds, no triggers/resolving factors, and associated with lightheadedness/fatigue. Event monitor reported mean heart rate of 93 bpm, patient events during sinus-sinus tachycardia predominantly, brief episodes of SVT, and 2 episodes of NSVT.  TTE and nuclear stress test were grossly normal. She is scheduled for cardiac PET scan next month. She presents today, 2024; has referral to my office for further evaluation/management of palpitations.  She also complains of episodes of dizziness. She also complains of daytime fatigue. She denies any other complaints at this time.    Prior Cardiac testin-day event monitor (8/15/2024): Sinus at  bpm (mean: 93 bpm), patient events during sinus at  bpm predominantly but 1 episode during NSVT and 1 during isolated PVC; SVT burden <1%, 5 brief episodes of SVT (longest: 9.2 seconds at 168 bpm), VE  murmurs, rubs, or gallops. PMI is nondisplaced, Peripheral pulses normal including carotid auscultation, no noted aortic bruit, bilateral femoral and pedal pulses are normal in quality  Lungs: clear to auscultation bilaterally, normal respiratory effort without used of accessory muscles, no wheezes  Abdomen: soft, non-tender, bowel sounds present, no masses or hepatomegaly   Extremities: no digital clubbing, no edema   Skin: warm, no rashes   Neuro/Psych: A&O x 3, normal mood and affect    Cardiac testing done today:   - EKG: sinus at 77 bpm, Qtc = 408 msec     Assessment/Plan:  IST  - Orthostatic VS today: wnl  - Start metoprolol XL 25 mg daily. She is to contact my office in ~2 weeks.  - Consider ivabradine.  - Follow-up with my office in ~3 months.     2. Daytime Fatigue  - Recommend referral to sleep medicine.  - She reportedly had a sleep study at [facility] in 2023.    3. Chest Discomfort  - Nuclear stress reportedly normal, but TID not reported.  - PET scan pending.  - Established with Dr Soriano ([facility]).    4. Dizziness  - Orthostatic VS today: wnl.    Thank you for allowing me to participate in your patient's care.  Please call me if there are any questions or concerns.      Davy Duvall, DO  Cardiac Electrophysiology  [facility]  The Heart and Vascular [place] [place] [facility]

## 2024-08-28 NOTE — PATIENT INSTRUCTIONS
START metoprolol. Contact Dr Duvall office in ~2 weeks to provide symptom update.  Please schedule appointment with sleep medicine.  Follow-up with this office in ~3 months.

## 2024-09-12 ENCOUNTER — TELEPHONE (OUTPATIENT)
Dept: CARDIOLOGY | Age: 49
End: 2024-09-12

## 2024-10-03 ENCOUNTER — TELEPHONE (OUTPATIENT)
Dept: BARIATRICS/WEIGHT MGMT | Age: 49
End: 2024-10-03

## 2024-10-03 ENCOUNTER — OFFICE VISIT (OUTPATIENT)
Dept: BARIATRICS/WEIGHT MGMT | Age: 49
End: 2024-10-03
Payer: COMMERCIAL

## 2024-10-03 ENCOUNTER — PATIENT MESSAGE (OUTPATIENT)
Dept: BARIATRICS/WEIGHT MGMT | Age: 49
End: 2024-10-03

## 2024-10-03 VITALS
TEMPERATURE: 97.6 F | WEIGHT: 183.4 LBS | HEIGHT: 61 IN | HEART RATE: 74 BPM | BODY MASS INDEX: 34.63 KG/M2 | SYSTOLIC BLOOD PRESSURE: 136 MMHG | DIASTOLIC BLOOD PRESSURE: 76 MMHG

## 2024-10-03 DIAGNOSIS — I10 ESSENTIAL HYPERTENSION: ICD-10-CM

## 2024-10-03 DIAGNOSIS — E66.811 CLASS 1 OBESITY DUE TO EXCESS CALORIES WITHOUT SERIOUS COMORBIDITY WITH BODY MASS INDEX (BMI) OF 34.0 TO 34.9 IN ADULT: ICD-10-CM

## 2024-10-03 DIAGNOSIS — E66.811 CLASS 1 OBESITY DUE TO EXCESS CALORIES WITHOUT SERIOUS COMORBIDITY WITH BODY MASS INDEX (BMI) OF 34.0 TO 34.9 IN ADULT: Primary | ICD-10-CM

## 2024-10-03 DIAGNOSIS — E66.09 CLASS 1 OBESITY DUE TO EXCESS CALORIES WITHOUT SERIOUS COMORBIDITY WITH BODY MASS INDEX (BMI) OF 34.0 TO 34.9 IN ADULT: ICD-10-CM

## 2024-10-03 DIAGNOSIS — I10 ESSENTIAL HYPERTENSION: Primary | ICD-10-CM

## 2024-10-03 DIAGNOSIS — E66.09 CLASS 1 OBESITY DUE TO EXCESS CALORIES WITHOUT SERIOUS COMORBIDITY WITH BODY MASS INDEX (BMI) OF 34.0 TO 34.9 IN ADULT: Primary | ICD-10-CM

## 2024-10-03 PROCEDURE — 1036F TOBACCO NON-USER: CPT | Performed by: INTERNAL MEDICINE

## 2024-10-03 PROCEDURE — 3078F DIAST BP <80 MM HG: CPT | Performed by: INTERNAL MEDICINE

## 2024-10-03 PROCEDURE — 3075F SYST BP GE 130 - 139MM HG: CPT | Performed by: INTERNAL MEDICINE

## 2024-10-03 PROCEDURE — 99205 OFFICE O/P NEW HI 60 MIN: CPT | Performed by: INTERNAL MEDICINE

## 2024-10-03 PROCEDURE — G8428 CUR MEDS NOT DOCUMENT: HCPCS | Performed by: INTERNAL MEDICINE

## 2024-10-03 PROCEDURE — G8484 FLU IMMUNIZE NO ADMIN: HCPCS | Performed by: INTERNAL MEDICINE

## 2024-10-03 PROCEDURE — G8417 CALC BMI ABV UP PARAM F/U: HCPCS | Performed by: INTERNAL MEDICINE

## 2024-10-03 PROCEDURE — 99202 OFFICE O/P NEW SF 15 MIN: CPT

## 2024-10-03 NOTE — PROGRESS NOTES
CC -   HTN, Obesity    BACKGROUND -   First visit: 10/3/24    Obesity (all weight in lbs)  Initial Ht 61, Wt 183.4,  BMI 34.65  Began - +20 lbs in the last last year  HS Grad wt 98   Lowest   wt 98 (115-120)  Highest  wt 190  Pattern of wt gain: gradual - up and down  Wt change past yr: +20 lbs  Most wt lost: 25 lbs (about 2 yrs ago) - a lot of exercise  Other diets attempted: has monitored calories in the past, no sugar - low carb diet, increased protein (was hungry all the time)    Initial Diet:    Number of meals per day - 2 (s-b)    Number of snacks per day - 3-4    Meal volume - 7\" plate,  rarely seconds    Fast food/convenience store - 1-2x/week    Restaurants (not fast food) - 1-2x/week (one or the other)   Sweets - 7d/week (eg candies or chocolate, or cookies or ice cream 3x/wk)   Chips - 5d/week   Crackers/pretzels - 2d/week   Nuts - 0d/week   Peanut Butter - 1-2d/week   Popcorn - 3d/week (makes her own)   Dried fruit - 0d/week   Whole fruit - 7d/week (1 serving)   Breakfast cereal - 0d/week   Granola/Protein/Energy bar - 0-1d/week   Sugar sweetened beverages - no pop/soda (water with frozen fruit in it), rarely fruit juice, Coffee - 1 cup in am - ~1 tbsp flavored creamer, green tea without honey or sugar (smt agave nectar), no energy drinks   Protein - No supplements   Fiber - No supplements    Wt effect of HR foods = 1750 Eitan/wk = 250 Eitan/d= 13.5% DEN = 26 lb/year.     Initial Exercise:    Gym membership - [employer] - not going there often ~1x/m    Walking - 1-2 miles 4-5x/wk - or bike riding    Running - no    Resistance - home exercise ([employer]) 1-2x/wk - weights and aerobics    Aerobic class - see above     Sleep: Takes Melatonin. ~6-6.5 hrs/night. Fairly rested, but crashes at about 2 pm, no daytime naps.  ______________________    PFS -  Past Medical History:   Diagnosis Date    Accelerated hypertension 7/23/2017    Asthma     Carpal tunnel syndrome of left wrist     Fibromuscular dysplasia (HCC)

## 2024-10-03 NOTE — PATIENT INSTRUCTIONS
Rules:  Limit sweets to one day per month  Limit chips/crackers/pretzels/nuts/popcorn to 150 eitan/day  Eliminate all sugar sweetened beverages (including fruit juice)  Limit restaurants (including fast food and food from a convenience store) to one time every two weeks while in town    Requirements:  Make sure protein intake is at least 75 grams per day (do not count protein every day; instead spot check your intake every 2-3 weeks and make sure what you think you are getting is close to accurate; consider using a protein shake if needed; these are in the pharmacy section of the stores, not the grocery section; Premier, Pure Protein and Fairlife are relatively inexpensive and taste good to most patients; other options are Nectar, Boost Max, Ensure Max, BeneProtein and GNC lean (which is lactose-free);   Nectar fruit, Premier Protein Clear, IsoPure Protein Drink, and Protein 2 O are water-based options; Quest (or Cosco, which is cheaper and is ordered on [employer]) and the [employer] 1 protein bars can also be used, but have less protein in them ) (<200 Eitan, >25 g protein) - (chicken, fish, turkey, egg white)  (Disclaimer: Dietary supplements rarely have their listed ingredients and the amount of each verified by a third party other. Sometimes they give verification for their claims to be GMO and gluten free and to be organic. However, even such verifications as these may still be untrustworthy.)  Make sure that fiber intake is at least 22 grams per day. Do this in part or whole by taking 12 tablespoons of Fiber one original cereal or Karel's All Bran Buds cereal, or 4 tablespoons of wheat dextrin powder (Benefiber or generic brand); for both of these, start with 1/8th - 1/4th the target amount and every week add another 1/8th - 1/4th until reaching the target).  Also, fiber gummies containing inulin (such as Nature Mad, Lantigua, Benefiber) or Fiber Choice Pre-biotic tablets containing inulin are also an option.1 cup of

## 2024-10-03 NOTE — TELEPHONE ENCOUNTER
Pt looked up meds they need an auth not specific if covered or not, pt stated please send in which would best suit pt

## 2024-10-14 ENCOUNTER — TELEPHONE (OUTPATIENT)
Dept: BARIATRICS/WEIGHT MGMT | Age: 49
End: 2024-10-14

## 2024-11-01 RX ORDER — METOPROLOL SUCCINATE 25 MG/1
25 TABLET, EXTENDED RELEASE ORAL DAILY
Qty: 90 TABLET | Refills: 3 | Status: SHIPPED | OUTPATIENT
Start: 2024-11-01 | End: 2025-10-27

## 2024-11-01 NOTE — TELEPHONE ENCOUNTER
Request: Fax from Pharmacy to refill Metoprolol     Last visit: 8/28/24 with Dr Davy Duvall  Next visit:  11/26/24 with Neli CRENSHAW    Provider: Dr Davy Duvall    Last Labs: 8/8/24   Last Refill: 8/28/24   Pharmacy: [facility]

## 2025-05-29 ENCOUNTER — TELEPHONE (OUTPATIENT)
Age: 50
End: 2025-05-29

## 2025-06-02 ENCOUNTER — OFFICE VISIT (OUTPATIENT)
Age: 50
End: 2025-06-02
Payer: COMMERCIAL

## 2025-06-02 VITALS
HEART RATE: 84 BPM | HEIGHT: 61 IN | SYSTOLIC BLOOD PRESSURE: 125 MMHG | WEIGHT: 187.2 LBS | DIASTOLIC BLOOD PRESSURE: 79 MMHG | TEMPERATURE: 97.2 F | BODY MASS INDEX: 35.34 KG/M2

## 2025-06-02 DIAGNOSIS — E66.812 CLASS 2 OBESITY DUE TO EXCESS CALORIES WITHOUT SERIOUS COMORBIDITY WITH BODY MASS INDEX (BMI) OF 35.0 TO 35.9 IN ADULT: ICD-10-CM

## 2025-06-02 DIAGNOSIS — E66.09 CLASS 2 OBESITY DUE TO EXCESS CALORIES WITHOUT SERIOUS COMORBIDITY WITH BODY MASS INDEX (BMI) OF 35.0 TO 35.9 IN ADULT: ICD-10-CM

## 2025-06-02 DIAGNOSIS — I10 ESSENTIAL HYPERTENSION: Primary | ICD-10-CM

## 2025-06-02 PROCEDURE — 1036F TOBACCO NON-USER: CPT | Performed by: INTERNAL MEDICINE

## 2025-06-02 PROCEDURE — 3078F DIAST BP <80 MM HG: CPT | Performed by: INTERNAL MEDICINE

## 2025-06-02 PROCEDURE — 3074F SYST BP LT 130 MM HG: CPT | Performed by: INTERNAL MEDICINE

## 2025-06-02 PROCEDURE — G8417 CALC BMI ABV UP PARAM F/U: HCPCS | Performed by: INTERNAL MEDICINE

## 2025-06-02 PROCEDURE — 99214 OFFICE O/P EST MOD 30 MIN: CPT | Performed by: INTERNAL MEDICINE

## 2025-06-02 PROCEDURE — G8427 DOCREV CUR MEDS BY ELIG CLIN: HCPCS | Performed by: INTERNAL MEDICINE

## 2025-06-02 RX ORDER — ESTRADIOL 0.05 MG/D
PATCH, EXTENDED RELEASE TRANSDERMAL
COMMUNITY
Start: 2025-05-18

## 2025-06-02 RX ORDER — PROGESTERONE 100 MG/1
CAPSULE ORAL
COMMUNITY

## 2025-06-02 RX ORDER — PHENTERMINE HYDROCHLORIDE 37.5 MG/1
TABLET ORAL
Qty: 30 TABLET | Refills: 0 | Status: SHIPPED | OUTPATIENT
Start: 2025-06-02 | End: 2025-07-02

## 2025-06-02 NOTE — PROGRESS NOTES
CC -   HTN, Obesity    BACKGROUND -   Last visit: 10/3/2024   First visit: 10/3/24    Obesity (all weight in lbs)  Initial Ht 61, Wt 183.4,  BMI 34.65  Began - +20 lbs in the last last year  HS Grad wt 98   Lowest   wt 98 (115-120)  Highest  wt 190  Pattern of wt gain: gradual - up and down  Wt change past yr: +20 lbs  Most wt lost: 25 lbs (about 2 yrs ago) - a lot of exercise  Other diets attempted: has monitored calories in the past, no sugar - low carb diet, increased protein (was hungry all the time)    Initial Diet:    Number of meals per day - 2 (s-b)    Number of snacks per day - 3-4    Meal volume - 7\" plate,  rarely seconds    Fast food/convenience store - 1-2x/week    Restaurants (not fast food) - 1-2x/week (one or the other)   Sweets - 7d/week (eg candies or chocolate, or cookies or ice cream 3x/wk)   Chips - 5d/week   Crackers/pretzels - 2d/week   Nuts - 0d/week   Peanut Butter - 1-2d/week   Popcorn - 3d/week (makes her own)   Dried fruit - 0d/week   Whole fruit - 7d/week (1 serving)   Breakfast cereal - 0d/week   Granola/Protein/Energy bar - 0-1d/week   Sugar sweetened beverages - no pop/soda (water with frozen fruit in it), rarely fruit juice, Coffee - 1 cup in am - ~1 tbsp flavored creamer, green tea without honey or sugar (smt agave nectar), no energy drinks   Protein - No supplements   Fiber - No supplements    Wt effect of HR foods = 1750 Eitan/wk = 250 Eitan/d= 13.5% DEN = 26 lb/year.     Initial Exercise:    Gym membership - [employer] - not going there often ~1x/m    Walking - 1-2 miles 4-5x/wk - or bike riding    Running - no    Resistance - home exercise ([employer]) 1-2x/wk - weights and aerobics    Aerobic class - see above     Sleep: Takes Melatonin. ~6-6.5 hrs/night. Fairly rested, but crashes at about 2 pm, no daytime naps.  ______________________    [facility] -  Past Medical History:   Diagnosis Date    Accelerated hypertension 7/23/2017    Asthma     Carpal tunnel syndrome of left wrist

## 2025-06-02 NOTE — PATIENT INSTRUCTIONS
Rules:  Limit sweets to one day per month  Limit chips/crackers/pretzels/nuts/popcorn to 150 charla/day  Eliminate all sugar sweetened beverages (including fruit juice)  Limit restaurants (including fast food and food from a convenience store) to one time every two weeks while in town    Requirements:  Make sure protein intake is at least 75 grams per day  Make sure that fiber intake is at least 22 grams per day  Take one multivitamin every day    Targets:  Count every calorie every day  Limit calorie intake to 1200 calories/day  Walk 30 minutes daily  Avoid eating 2 hours within bedtime.     Tips:  Do not eat outside of the dining room or the kitchen  Do not eat while watching TV, videos, working on the computer or using a smart phone  Do not eat food out of a multi-serving bag or container.    Phentermine:  Take phentermine 37.5 mg, one-half to one tablet daily as needed for appetite suppression. Take each dose 30-90 min before effect will be needed.    While taking phentermine, check the Blood Pressure every morning and every evening.     If the systolic BP is >155 mmHg, the diastolic BP is >90 mm/Hg or the heart rate is > 100 beats per minute, do not take phentermine that day.    If the systolic BP is consistently >155 mmHg, the diastolic BP is consistently above 90 mm/Hg or the heart rate is consistently > 100 beats per minute, then stop taking phentermine altogether.    If the systolic BP >170 mmHg or the diastolic BP is >100 mmHg (even if it is only once), then phentermine should be stopped altogether without proving that any of these are consistently elevated.    Follow up in 30 days.

## 2025-06-04 ENCOUNTER — TRANSCRIBE ORDERS (OUTPATIENT)
Dept: ADMINISTRATIVE | Age: 50
End: 2025-06-04

## 2025-06-04 DIAGNOSIS — E83.59 TUMORAL CALCINOSIS: ICD-10-CM

## 2025-06-04 DIAGNOSIS — R19.4 CHANGE IN BOWEL HABITS: ICD-10-CM

## 2025-06-04 DIAGNOSIS — R10.84 ABDOMINAL PAIN, GENERALIZED: Primary | ICD-10-CM

## 2025-06-09 ENCOUNTER — HOSPITAL ENCOUNTER (OUTPATIENT)
Age: 50
Discharge: HOME | End: 2025-06-09
Payer: COMMERCIAL

## 2025-06-09 LAB
ALBUMIN SERPL-MCNC: 4.1 G/DL (ref 3.5–5.2)
ALP SERPL-CCNC: 63 U/L (ref 35–104)
ALT SERPL-CCNC: 29 U/L (ref 0–32)
AST SERPL-CCNC: 25 U/L (ref 0–31)
BILIRUB DIRECT SERPL-MCNC: <0.2 MG/DL (ref 0–0.3)
BILIRUB INDIRECT SERPL-MCNC: NORMAL MG/DL (ref 0–1)
BILIRUB SERPL-MCNC: 0.5 MG/DL (ref 0–1.2)
HCT VFR BLD AUTO: 42.8 % (ref 34–48)
PROT SERPL-MCNC: 6.9 G/DL (ref 6.4–8.3)

## 2025-06-09 PROCEDURE — 80076 HEPATIC FUNCTION PANEL: CPT

## 2025-06-09 PROCEDURE — 82784 ASSAY IGA/IGD/IGG/IGM EACH: CPT

## 2025-06-09 PROCEDURE — 85014 HEMATOCRIT: CPT

## 2025-06-09 PROCEDURE — 83516 IMMUNOASSAY NONANTIBODY: CPT

## 2025-06-09 PROCEDURE — 86316 IMMUNOASSAY TUMOR OTHER: CPT

## 2025-06-09 PROCEDURE — 36415 COLL VENOUS BLD VENIPUNCTURE: CPT

## 2025-06-09 PROCEDURE — 84270 ASSAY OF SEX HORMONE GLOBUL: CPT

## 2025-06-09 PROCEDURE — 84403 ASSAY OF TOTAL TESTOSTERONE: CPT

## 2025-06-10 LAB
IGA SERPL-MCNC: 389 MG/DL (ref 70–400)
TESTOST SERPL-MCNC: 77 NG/DL (ref 8–48)

## 2025-06-11 LAB
GLIADIN IGA SER IA-ACNC: 1.5 U/ML
GLIADIN IGG SER IA-ACNC: <0.4 U/ML
SHBG SERPL-SCNC: 27 NMOL/L (ref 25–122)
TISSUE TRANSGLUTAMINASE ANTIBODY IGG: <0.6 U/ML
TTG IGA SER IA-ACNC: 0.5 U/ML

## 2025-06-13 LAB — CHROMOGRANIN A: 26 NG/ML (ref 0–187)

## 2025-07-01 ENCOUNTER — OFFICE VISIT (OUTPATIENT)
Age: 50
End: 2025-07-01
Payer: COMMERCIAL

## 2025-07-01 VITALS
BODY MASS INDEX: 33.76 KG/M2 | HEART RATE: 86 BPM | TEMPERATURE: 97.2 F | SYSTOLIC BLOOD PRESSURE: 129 MMHG | DIASTOLIC BLOOD PRESSURE: 81 MMHG | HEIGHT: 61 IN | WEIGHT: 178.8 LBS

## 2025-07-01 DIAGNOSIS — E66.09 CLASS 2 OBESITY DUE TO EXCESS CALORIES WITHOUT SERIOUS COMORBIDITY WITH BODY MASS INDEX (BMI) OF 35.0 TO 35.9 IN ADULT: ICD-10-CM

## 2025-07-01 DIAGNOSIS — E66.812 CLASS 2 OBESITY DUE TO EXCESS CALORIES WITHOUT SERIOUS COMORBIDITY WITH BODY MASS INDEX (BMI) OF 35.0 TO 35.9 IN ADULT: ICD-10-CM

## 2025-07-01 DIAGNOSIS — I10 ESSENTIAL HYPERTENSION: Primary | ICD-10-CM

## 2025-07-01 PROCEDURE — 1036F TOBACCO NON-USER: CPT | Performed by: INTERNAL MEDICINE

## 2025-07-01 PROCEDURE — G8417 CALC BMI ABV UP PARAM F/U: HCPCS | Performed by: INTERNAL MEDICINE

## 2025-07-01 PROCEDURE — 99214 OFFICE O/P EST MOD 30 MIN: CPT | Performed by: INTERNAL MEDICINE

## 2025-07-01 PROCEDURE — G8428 CUR MEDS NOT DOCUMENT: HCPCS | Performed by: INTERNAL MEDICINE

## 2025-07-01 PROCEDURE — 3074F SYST BP LT 130 MM HG: CPT | Performed by: INTERNAL MEDICINE

## 2025-07-01 PROCEDURE — 3079F DIAST BP 80-89 MM HG: CPT | Performed by: INTERNAL MEDICINE

## 2025-07-01 RX ORDER — PHENTERMINE HYDROCHLORIDE 37.5 MG/1
TABLET ORAL
Qty: 30 TABLET | Refills: 0 | Status: SHIPPED | OUTPATIENT
Start: 2025-07-01 | End: 2025-07-31

## 2025-07-01 NOTE — PATIENT INSTRUCTIONS
Rules:  Limit sweets to one day per month  Limit chips/crackers/pretzels/nuts/popcorn to 150 charla/day  Eliminate all sugar sweetened beverages (including fruit juice)  Limit restaurants (including fast food and food from a convenience store) to one time every two weeks while in town    Requirements:  Make sure protein intake is at least 75 grams per day  Make sure that fiber intake is at least 22 grams per day  Take one multivitamin every day    Targets:  Count every calorie every day  Limit calorie intake to 1200 calories/day  Walk 30 minutes daily  Avoid eating 2 hours within bedtime.     Tips:  Do not eat outside of the dining room or the kitchen  Do not eat while watching TV, videos, working on the computer or using a smart phone  Do not eat food out of a multi-serving bag or container.    Please ask your insurance about coverage on Zepbound (starting dose 2.5 mg weekly), Wegovy (starting dose 0.25 mg weekly) or Saxenda (starting dose 0.6 mg daily).    Phentermine:  Take phentermine 37.5 mg, one-half to one tablet daily as needed for appetite suppression. Take each dose 30-90 min before effect will be needed.    While taking phentermine, check the Blood Pressure every morning and every evening.     If the systolic BP is >155 mmHg, the diastolic BP is >90 mm/Hg or the heart rate is > 100 beats per minute, do not take phentermine that day.    If the systolic BP is consistently >155 mmHg, the diastolic BP is consistently above 90 mm/Hg or the heart rate is consistently > 100 beats per minute, then stop taking phentermine altogether.    If the systolic BP >170 mmHg or the diastolic BP is >100 mmHg (even if it is only once), then phentermine should be stopped altogether without proving that any of these are consistently elevated.    Goal weight   For September '25: 177.8 lbs or less;  For October '25: 169.8 lbs or less;    Follow up in 30 days.   pt states he was seen in the ED and told he needed a toe amputation. states he was not able to be admitted and went home, states he cleaned it out at home with "91 proof" and wants to checked out.

## 2025-07-01 NOTE — PROGRESS NOTES
CC -   HTN, Obesity    BACKGROUND -   Last visit: 6/2/2025  First visit: 10/3/24    Obesity (all weight in lbs)  Initial Ht 61, Wt 183.4,  BMI 34.65  Began - +20 lbs in the last last year  HS Grad wt 98   Lowest   wt 98 (115-120)  Highest  wt 190  Pattern of wt gain: gradual - up and down  Wt change past yr: +20 lbs  Most wt lost: 25 lbs (about 2 yrs ago) - a lot of exercise  Other diets attempted: has monitored calories in the past, no sugar - low carb diet, increased protein (was hungry all the time)    Initial Diet:    Number of meals per day - 2 (s-b)    Number of snacks per day - 3-4    Meal volume - 7\" plate,  rarely seconds    Fast food/convenience store - 1-2x/week    Restaurants (not fast food) - 1-2x/week (one or the other)   Sweets - 7d/week (eg candies or chocolate, or cookies or ice cream 3x/wk)   Chips - 5d/week   Crackers/pretzels - 2d/week   Nuts - 0d/week   Peanut Butter - 1-2d/week   Popcorn - 3d/week (makes her own)   Dried fruit - 0d/week   Whole fruit - 7d/week (1 serving)   Breakfast cereal - 0d/week   Granola/Protein/Energy bar - 0-1d/week   Sugar sweetened beverages - no pop/soda (water with frozen fruit in it), rarely fruit juice, Coffee - 1 cup in am - ~1 tbsp flavored creamer, green tea without honey or sugar (smt agave nectar), no energy drinks   Protein - No supplements   Fiber - No supplements    Wt effect of HR foods = 1750 Eitan/wk = 250 Eitan/d= 13.5% DEN = 26 lb/year.     Initial Exercise:    Gym membership - [employer] - not going there often ~1x/m    Walking - 1-2 miles 4-5x/wk - or bike riding    Running - no    Resistance - home exercise ([employer]) 1-2x/wk - weights and aerobics    Aerobic class - see above     Sleep: Takes Melatonin. ~6-6.5 hrs/night. Fairly rested, but crashes at about 2 pm, no daytime naps.  ______________________    [facility] -  Past Medical History:   Diagnosis Date    Accelerated hypertension 7/23/2017    Asthma     Carpal tunnel syndrome of left wrist

## 2025-07-11 ENCOUNTER — HOSPITAL ENCOUNTER (OUTPATIENT)
Dept: CT IMAGING | Age: 50
Discharge: HOME | End: 2025-07-13
Payer: COMMERCIAL

## 2025-07-11 ENCOUNTER — HOSPITAL ENCOUNTER (OUTPATIENT)
Age: 50
Discharge: HOME | End: 2025-07-11
Payer: COMMERCIAL

## 2025-07-11 DIAGNOSIS — E83.59 TUMORAL CALCINOSIS: ICD-10-CM

## 2025-07-11 DIAGNOSIS — R10.84 ABDOMINAL PAIN, GENERALIZED: ICD-10-CM

## 2025-07-11 DIAGNOSIS — R19.4 CHANGE IN BOWEL HABITS: ICD-10-CM

## 2025-07-11 LAB
BUN SERPL-MCNC: 12 MG/DL (ref 6–20)
CREAT SERPL-MCNC: 1 MG/DL (ref 0.5–1)
GFR, ESTIMATED: 67 ML/MIN/1.73M2

## 2025-07-11 PROCEDURE — 84520 ASSAY OF UREA NITROGEN: CPT

## 2025-07-11 PROCEDURE — 36415 COLL VENOUS BLD VENIPUNCTURE: CPT

## 2025-07-11 PROCEDURE — 82565 ASSAY OF CREATININE: CPT

## 2025-08-05 ENCOUNTER — OFFICE VISIT (OUTPATIENT)
Age: 50
End: 2025-08-05
Payer: COMMERCIAL

## 2025-08-05 VITALS
DIASTOLIC BLOOD PRESSURE: 74 MMHG | HEIGHT: 61 IN | HEART RATE: 90 BPM | WEIGHT: 177 LBS | TEMPERATURE: 97.7 F | SYSTOLIC BLOOD PRESSURE: 131 MMHG | BODY MASS INDEX: 33.42 KG/M2

## 2025-08-05 DIAGNOSIS — I10 ESSENTIAL HYPERTENSION: Primary | ICD-10-CM

## 2025-08-05 DIAGNOSIS — E66.09 CLASS 1 OBESITY DUE TO EXCESS CALORIES WITHOUT SERIOUS COMORBIDITY WITH BODY MASS INDEX (BMI) OF 33.0 TO 33.9 IN ADULT: ICD-10-CM

## 2025-08-05 DIAGNOSIS — E66.811 CLASS 1 OBESITY DUE TO EXCESS CALORIES WITHOUT SERIOUS COMORBIDITY WITH BODY MASS INDEX (BMI) OF 33.0 TO 33.9 IN ADULT: ICD-10-CM

## 2025-08-05 PROCEDURE — 3075F SYST BP GE 130 - 139MM HG: CPT | Performed by: INTERNAL MEDICINE

## 2025-08-05 PROCEDURE — 1036F TOBACCO NON-USER: CPT | Performed by: INTERNAL MEDICINE

## 2025-08-05 PROCEDURE — G8428 CUR MEDS NOT DOCUMENT: HCPCS | Performed by: INTERNAL MEDICINE

## 2025-08-05 PROCEDURE — 99214 OFFICE O/P EST MOD 30 MIN: CPT | Performed by: INTERNAL MEDICINE

## 2025-08-05 PROCEDURE — 3078F DIAST BP <80 MM HG: CPT | Performed by: INTERNAL MEDICINE

## 2025-08-05 PROCEDURE — G8417 CALC BMI ABV UP PARAM F/U: HCPCS | Performed by: INTERNAL MEDICINE

## 2025-08-05 RX ORDER — LIRAGLUTIDE 6 MG/ML
INJECTION SUBCUTANEOUS
Qty: 3 ADJUSTABLE DOSE PRE-FILLED PEN SYRINGE | Refills: 2 | Status: SHIPPED | OUTPATIENT
Start: 2025-08-05

## 2025-08-05 RX ORDER — PHENTERMINE HYDROCHLORIDE 37.5 MG/1
TABLET ORAL
Qty: 30 TABLET | Refills: 0 | Status: SHIPPED | OUTPATIENT
Start: 2025-08-05 | End: 2025-09-04

## 2025-09-02 ENCOUNTER — OFFICE VISIT (OUTPATIENT)
Age: 50
End: 2025-09-02
Payer: COMMERCIAL

## 2025-09-02 VITALS
SYSTOLIC BLOOD PRESSURE: 131 MMHG | HEIGHT: 61 IN | DIASTOLIC BLOOD PRESSURE: 80 MMHG | HEART RATE: 109 BPM | WEIGHT: 172.4 LBS | BODY MASS INDEX: 32.55 KG/M2 | TEMPERATURE: 98.2 F

## 2025-09-02 DIAGNOSIS — E66.811 CLASS 1 OBESITY DUE TO EXCESS CALORIES WITHOUT SERIOUS COMORBIDITY WITH BODY MASS INDEX (BMI) OF 33.0 TO 33.9 IN ADULT: ICD-10-CM

## 2025-09-02 DIAGNOSIS — I10 ESSENTIAL HYPERTENSION: Primary | ICD-10-CM

## 2025-09-02 DIAGNOSIS — E66.09 CLASS 1 OBESITY DUE TO EXCESS CALORIES WITHOUT SERIOUS COMORBIDITY WITH BODY MASS INDEX (BMI) OF 33.0 TO 33.9 IN ADULT: ICD-10-CM

## 2025-09-02 PROCEDURE — G8428 CUR MEDS NOT DOCUMENT: HCPCS | Performed by: INTERNAL MEDICINE

## 2025-09-02 PROCEDURE — 3079F DIAST BP 80-89 MM HG: CPT | Performed by: INTERNAL MEDICINE

## 2025-09-02 PROCEDURE — G8417 CALC BMI ABV UP PARAM F/U: HCPCS | Performed by: INTERNAL MEDICINE

## 2025-09-02 PROCEDURE — 3075F SYST BP GE 130 - 139MM HG: CPT | Performed by: INTERNAL MEDICINE

## 2025-09-02 PROCEDURE — 99214 OFFICE O/P EST MOD 30 MIN: CPT | Performed by: INTERNAL MEDICINE

## 2025-09-02 PROCEDURE — 1036F TOBACCO NON-USER: CPT | Performed by: INTERNAL MEDICINE

## 2025-09-02 RX ORDER — PHENTERMINE HYDROCHLORIDE 37.5 MG/1
TABLET ORAL
Qty: 30 TABLET | Refills: 0 | Status: SHIPPED | OUTPATIENT
Start: 2025-09-02 | End: 2025-10-02

## (undated) DEVICE — SOLUTION IV IRRIG POUR BRL 0.9% SODIUM CHL 2F7124

## (undated) DEVICE — APPLICATOR PREP 26ML 0.7% IOD POVACRYLEX 74% ISO ALC ST

## (undated) DEVICE — PADDING UNDERCAST W3INXL4YD PURE COT FBR LO LINTING WYTEX

## (undated) DEVICE — Device

## (undated) DEVICE — LEAD HAND

## (undated) DEVICE — SURGICAL PROCEDURE PACK HND

## (undated) DEVICE — STRIP,CLOSURE,WOUND,MEDI-STRIP,1/2X4: Brand: MEDLINE

## (undated) DEVICE — SET CARPEL TUNNEL

## (undated) DEVICE — STRIP,CLOSURE,WOUND,MEDI-STRIP,1/4X3: Brand: MEDLINE

## (undated) DEVICE — STANDARD (U) BLADE ASSEMBLY 6PK: Brand: MICROAIRE®

## (undated) DEVICE — INTENDED FOR TISSUE SEPARATION, AND OTHER PROCEDURES THAT REQUIRE A SHARP SURGICAL BLADE TO PUNCTURE OR CUT.: Brand: BARD-PARKER ® STAINLESS STEEL BLADES

## (undated) DEVICE — MASTISOL ADHESIVE LIQ 2/3ML

## (undated) DEVICE — PADDING UNDERCAST W4INXL4YD COT FBR LO LINTING WYTEX

## (undated) DEVICE — DOUBLE BASIN SET: Brand: MEDLINE INDUSTRIES, INC.

## (undated) DEVICE — PADDING UNDERCAST W2INXL4YD COT WYTEX

## (undated) DEVICE — GOWN,SIRUS,FABRNF,L,20/CS: Brand: MEDLINE

## (undated) DEVICE — ZIMMER® STERILE DISPOSABLE TOURNIQUET CUFF WITH PROTECTIVE SLEEVE AND PLC, DUAL PORT, SINGLE BLADDER, 18 IN. (46 CM)

## (undated) DEVICE — COVER HNDL LT DISP

## (undated) DEVICE — 4-PORT MANIFOLD: Brand: NEPTUNE 2

## (undated) DEVICE — PADDING CAST W4INXL4YD SPUN DACRON POLY POR SYN VERSATILE

## (undated) DEVICE — ARM CRADLE: Brand: DEROYAL

## (undated) DEVICE — DRAPE,U/ SHT,SPLIT,PLAS,STERIL: Brand: MEDLINE

## (undated) DEVICE — VESSEL LOOPS,MAXI, RED: Brand: DEVON